# Patient Record
Sex: FEMALE | Race: WHITE | NOT HISPANIC OR LATINO | Employment: OTHER | URBAN - METROPOLITAN AREA
[De-identification: names, ages, dates, MRNs, and addresses within clinical notes are randomized per-mention and may not be internally consistent; named-entity substitution may affect disease eponyms.]

---

## 2020-06-30 PROBLEM — M50.20 HERNIATED DISC, CERVICAL: Status: ACTIVE | Noted: 2020-06-30

## 2020-06-30 PROBLEM — E78.2 MIXED HYPERLIPIDEMIA: Status: ACTIVE | Noted: 2020-06-30

## 2020-06-30 PROBLEM — E11.65 UNCONTROLLED TYPE 2 DIABETES MELLITUS WITH HYPERGLYCEMIA: Status: ACTIVE | Noted: 2020-06-30

## 2020-07-10 ENCOUNTER — TELEPHONE (OUTPATIENT)
Dept: NEPHROLOGY | Facility: CLINIC | Age: 70
End: 2020-07-10

## 2020-07-10 NOTE — TELEPHONE ENCOUNTER
----- Message from Claudette Mccoy sent at 7/10/2020 10:14 AM CDT -----  Regarding: Sooner appointment request  Contact: pt  Type: Sooner appointment request    Caller is requesting a sooner appointment.    Who Called:  pt  When is the first available appointment: 8/19/20  Symptoms:   creatinine levels are high  Best call back number: [phone]  Additional Information:  New pt need appt . Please advise

## 2020-07-10 NOTE — TELEPHONE ENCOUNTER
Left message on voicemail for patient to call me back if I can help.      It appears this patient is scheduled for Monday.

## 2020-07-13 ENCOUNTER — TELEPHONE (OUTPATIENT)
Dept: NEPHROLOGY | Facility: CLINIC | Age: 70
End: 2020-07-13

## 2020-07-13 ENCOUNTER — OFFICE VISIT (OUTPATIENT)
Dept: NEPHROLOGY | Facility: CLINIC | Age: 70
End: 2020-07-13
Payer: MEDICARE

## 2020-07-13 VITALS — SYSTOLIC BLOOD PRESSURE: 130 MMHG | DIASTOLIC BLOOD PRESSURE: 80 MMHG

## 2020-07-13 DIAGNOSIS — N17.9 AKI (ACUTE KIDNEY INJURY): Primary | ICD-10-CM

## 2020-07-13 PROCEDURE — 1101F PR PT FALLS ASSESS DOC 0-1 FALLS W/OUT INJ PAST YR: ICD-10-PCS | Mod: CPTII,95,, | Performed by: INTERNAL MEDICINE

## 2020-07-13 PROCEDURE — 99204 OFFICE O/P NEW MOD 45 MIN: CPT | Mod: 95,,, | Performed by: INTERNAL MEDICINE

## 2020-07-13 PROCEDURE — 99204 PR OFFICE/OUTPT VISIT, NEW, LEVL IV, 45-59 MIN: ICD-10-PCS | Mod: 95,,, | Performed by: INTERNAL MEDICINE

## 2020-07-13 PROCEDURE — 1159F PR MEDICATION LIST DOCUMENTED IN MEDICAL RECORD: ICD-10-PCS | Mod: 95,,, | Performed by: INTERNAL MEDICINE

## 2020-07-13 PROCEDURE — 1101F PT FALLS ASSESS-DOCD LE1/YR: CPT | Mod: CPTII,95,, | Performed by: INTERNAL MEDICINE

## 2020-07-13 PROCEDURE — 1159F MED LIST DOCD IN RCRD: CPT | Mod: 95,,, | Performed by: INTERNAL MEDICINE

## 2020-07-13 NOTE — TELEPHONE ENCOUNTER
----- Message from Lakeshia Clark sent at 7/13/2020  3:28 PM CDT -----  Contact: Collin with [facility]  Type: Needs Medical Advice  Who Called:  Collin with [facility]  Best Call Back Number: [phone]  Additional Information: calling to get the surgery clearance from patient's appointment today. Please fax the information over to .

## 2020-07-13 NOTE — PROGRESS NOTES
Subjective:       Patient ID: Agustina Crow is a 69 y.o. White female who presents for return patient evaluation for chronic renal failure.    The patient location is:  Patient Home   The chief complaint leading to consultation is: elevated SCr  Visit type: Virtual visit with synchronous audio and video  Total time spent with patient: 20 minutes  Each patient to whom he or she provides medical services by telemedicine is:  (1) informed of the relationship between the physician and patient and the respective role of any other health care provider with respect to management of the patient; and (2) notified that he or she may decline to receive medical services by telemedicine and may withdraw from such care at any time.     She presents today to be evaluated prior to her planned surgery tomorrow for C5-6 disk disease.  She has no uremic or urinary symptoms and is in her usual state of health.  There have been no recent illnesses, hospitalizations or procedures.  She denies NSAIDs.  Her recent creatinine was 1.3.        Review of Systems   Constitutional: Negative for appetite change, chills and fever.   HENT: Negative for congestion.    Eyes: Negative for visual disturbance.   Respiratory: Negative for cough and shortness of breath.    Cardiovascular: Negative for chest pain and leg swelling.   Gastrointestinal: Negative for abdominal pain, diarrhea, nausea and vomiting.   Genitourinary: Negative for difficulty urinating, dysuria and hematuria.   Musculoskeletal: Positive for neck pain. Negative for myalgias.   Skin: Negative for rash.   Neurological: Negative for headaches.   Psychiatric/Behavioral: Negative for sleep disturbance.       The past medical, family and social histories were reviewed for this encounter.     /80     Objective:      Physical Exam  Vitals signs reviewed.   Constitutional:       General: She is not in acute distress.     Appearance: She is well-developed.   HENT:      Head:  Normocephalic and atraumatic.   Eyes:      General: No scleral icterus.  Pulmonary:      Effort: Pulmonary effort is normal.   Neurological:      Mental Status: She is alert and oriented to person, place, and time.   Psychiatric:         Mood and Affect: Mood normal.         Behavior: Behavior normal.        Assessment:       1. TAMERA (acute kidney injury)        Plan:   Return to clinic in 4 mo.  Labs for next visit include rp, ua next week.  Baseline creatinine is 0.8.  Continue current medications as prescribed and reviewed.   Blood pressure is controlled on the current regimen.  I will send this message to both Catalina Herron MD and Dr. Cooper.  I see no reason to not proceed with the planned procedure tomorrow.  I will re-evaluate her after that is done.

## 2020-07-23 ENCOUNTER — TELEPHONE (OUTPATIENT)
Dept: NEPHROLOGY | Facility: CLINIC | Age: 70
End: 2020-07-23

## 2020-07-23 DIAGNOSIS — N17.9 AKI (ACUTE KIDNEY INJURY): Primary | ICD-10-CM

## 2020-07-23 NOTE — TELEPHONE ENCOUNTER
----- Message from Chema DUARTE Avtar sent at 7/23/2020 12:16 PM CDT -----  Regarding: Orders  Contact: [facility] Cat Fernandez called in and stated patient showed up to get labs done & told her that Dr. Stiles told her to go there & have it done but they have no orders?    Rebecca's call back number is [phone]

## 2020-07-23 NOTE — TELEPHONE ENCOUNTER
Labs entered and available for:    Return to clinic in 4 mo.  Labs for next visit include rp, ua next week.    No answer at number provided.

## 2021-01-22 ENCOUNTER — PATIENT MESSAGE (OUTPATIENT)
Dept: ADMINISTRATIVE | Facility: OTHER | Age: 71
End: 2021-01-22

## 2021-02-09 ENCOUNTER — CLINICAL SUPPORT (OUTPATIENT)
Dept: URGENT CARE | Facility: CLINIC | Age: 71
End: 2021-02-09
Payer: MEDICARE

## 2021-02-09 DIAGNOSIS — U07.1 COVID-19 VIRUS DETECTED: ICD-10-CM

## 2021-02-09 DIAGNOSIS — Z11.52 ENCOUNTER FOR SCREENING LABORATORY TESTING FOR COVID-19 VIRUS IN ASYMPTOMATIC PATIENT: Primary | ICD-10-CM

## 2021-02-09 DIAGNOSIS — Z01.812 ENCOUNTER FOR SCREENING LABORATORY TESTING FOR COVID-19 VIRUS IN ASYMPTOMATIC PATIENT: Primary | ICD-10-CM

## 2021-02-09 LAB
CTP QC/QA: YES
SARS-COV-2 RDRP RESP QL NAA+PROBE: POSITIVE

## 2021-02-09 PROCEDURE — U0002: ICD-10-PCS | Mod: QW,S$GLB,, | Performed by: NURSE PRACTITIONER

## 2021-02-09 PROCEDURE — U0002 COVID-19 LAB TEST NON-CDC: HCPCS | Mod: QW,S$GLB,, | Performed by: NURSE PRACTITIONER

## 2022-01-21 ENCOUNTER — TELEPHONE (OUTPATIENT)
Dept: NEUROLOGY | Facility: CLINIC | Age: 72
End: 2022-01-21
Payer: MEDICARE

## 2022-01-21 NOTE — TELEPHONE ENCOUNTER
Left message for the the daughter (Michael [phone]) to call back to discuss scheduling the pt with NP for memory decline. Pt referred from [facility].

## 2022-01-26 PROBLEM — N18.31 CHRONIC KIDNEY DISEASE, STAGE 3A: Status: ACTIVE | Noted: 2022-01-26

## 2022-01-31 ENCOUNTER — OFFICE VISIT (OUTPATIENT)
Dept: URGENT CARE | Facility: CLINIC | Age: 72
End: 2022-01-31
Payer: MEDICARE

## 2022-01-31 VITALS
HEIGHT: 65 IN | BODY MASS INDEX: 21.66 KG/M2 | DIASTOLIC BLOOD PRESSURE: 71 MMHG | WEIGHT: 130 LBS | OXYGEN SATURATION: 99 % | SYSTOLIC BLOOD PRESSURE: 159 MMHG | RESPIRATION RATE: 16 BRPM | HEART RATE: 69 BPM | TEMPERATURE: 98 F

## 2022-01-31 DIAGNOSIS — R05.9 COUGH: Primary | ICD-10-CM

## 2022-01-31 DIAGNOSIS — B34.9 VIRAL SYNDROME: ICD-10-CM

## 2022-01-31 LAB
CTP QC/QA: YES
SARS-COV-2 RDRP RESP QL NAA+PROBE: NEGATIVE

## 2022-01-31 PROCEDURE — 4010F PR ACE/ARB THEARPY RXD/TAKEN: ICD-10-PCS | Mod: CPTII,S$GLB,, | Performed by: EMERGENCY MEDICINE

## 2022-01-31 PROCEDURE — 3077F PR MOST RECENT SYSTOLIC BLOOD PRESSURE >= 140 MM HG: ICD-10-PCS | Mod: CPTII,S$GLB,, | Performed by: EMERGENCY MEDICINE

## 2022-01-31 PROCEDURE — U0002 COVID-19 LAB TEST NON-CDC: HCPCS | Mod: QW,S$GLB,, | Performed by: EMERGENCY MEDICINE

## 2022-01-31 PROCEDURE — 3078F DIAST BP <80 MM HG: CPT | Mod: CPTII,S$GLB,, | Performed by: EMERGENCY MEDICINE

## 2022-01-31 PROCEDURE — 4010F ACE/ARB THERAPY RXD/TAKEN: CPT | Mod: CPTII,S$GLB,, | Performed by: EMERGENCY MEDICINE

## 2022-01-31 PROCEDURE — U0002: ICD-10-PCS | Mod: QW,S$GLB,, | Performed by: EMERGENCY MEDICINE

## 2022-01-31 PROCEDURE — 99203 OFFICE O/P NEW LOW 30 MIN: CPT | Mod: S$GLB,CS,, | Performed by: EMERGENCY MEDICINE

## 2022-01-31 PROCEDURE — 1159F PR MEDICATION LIST DOCUMENTED IN MEDICAL RECORD: ICD-10-PCS | Mod: CPTII,S$GLB,, | Performed by: EMERGENCY MEDICINE

## 2022-01-31 PROCEDURE — 3008F BODY MASS INDEX DOCD: CPT | Mod: CPTII,S$GLB,, | Performed by: EMERGENCY MEDICINE

## 2022-01-31 PROCEDURE — 99203 PR OFFICE/OUTPT VISIT, NEW, LEVL III, 30-44 MIN: ICD-10-PCS | Mod: S$GLB,CS,, | Performed by: EMERGENCY MEDICINE

## 2022-01-31 PROCEDURE — 3008F PR BODY MASS INDEX (BMI) DOCUMENTED: ICD-10-PCS | Mod: CPTII,S$GLB,, | Performed by: EMERGENCY MEDICINE

## 2022-01-31 PROCEDURE — 3078F PR MOST RECENT DIASTOLIC BLOOD PRESSURE < 80 MM HG: ICD-10-PCS | Mod: CPTII,S$GLB,, | Performed by: EMERGENCY MEDICINE

## 2022-01-31 PROCEDURE — 1160F PR REVIEW ALL MEDS BY PRESCRIBER/CLIN PHARMACIST DOCUMENTED: ICD-10-PCS | Mod: CPTII,S$GLB,, | Performed by: EMERGENCY MEDICINE

## 2022-01-31 PROCEDURE — 1159F MED LIST DOCD IN RCRD: CPT | Mod: CPTII,S$GLB,, | Performed by: EMERGENCY MEDICINE

## 2022-01-31 PROCEDURE — 1160F RVW MEDS BY RX/DR IN RCRD: CPT | Mod: CPTII,S$GLB,, | Performed by: EMERGENCY MEDICINE

## 2022-01-31 PROCEDURE — 3077F SYST BP >= 140 MM HG: CPT | Mod: CPTII,S$GLB,, | Performed by: EMERGENCY MEDICINE

## 2022-01-31 NOTE — PROGRESS NOTES
"Subjective:       Patient ID: Agustina Crow is a 71 y.o. female.    Vitals:  height is 5' 4.75" (1.645 m) and weight is 59 kg (130 lb). Her temperature is 98.1 °F (36.7 °C). Her blood pressure is 159/71 (abnormal) and her pulse is 69. Her respiration is 16 and oxygen saturation is 99%.     Chief Complaint: COVID-19 Concerns    Sore throat, cough- started last night   No OTC mediations   Exposed to COVID, COVID vaccinated     Other  This is a new problem. The current episode started yesterday. The problem occurs constantly. The problem has been gradually worsening. Associated symptoms include congestion, coughing and a sore throat. She has tried nothing for the symptoms. The treatment provided no relief.       HENT: Positive for congestion and sore throat.    Respiratory: Positive for cough.        Objective:      Physical Exam   Constitutional: She is oriented to person, place, and time. She appears well-developed and well-nourished. She is cooperative.  Non-toxic appearance. She does not have a sickly appearance. She does not appear ill. No distress.   HENT:   Head: Normocephalic and atraumatic.   Ears:   Right Ear: Hearing and external ear normal.   Left Ear: Hearing and external ear normal.   Nose: Nose normal. No mucosal edema, rhinorrhea or nasal deformity. No epistaxis. Right sinus exhibits no maxillary sinus tenderness and no frontal sinus tenderness. Left sinus exhibits no maxillary sinus tenderness and no frontal sinus tenderness.   Mouth/Throat: Uvula is midline, oropharynx is clear and moist and mucous membranes are normal. Mucous membranes are moist. No trismus in the jaw. Normal dentition. No uvula swelling. No oropharyngeal exudate, posterior oropharyngeal edema or posterior oropharyngeal erythema. Oropharynx is clear.   Eyes: Conjunctivae and lids are normal. No scleral icterus.   Neck: Trachea normal and phonation normal. Neck supple. No edema present. No erythema present. No neck rigidity present. "   Cardiovascular: Normal rate, regular rhythm, normal heart sounds, intact distal pulses and normal pulses.   Pulmonary/Chest: Effort normal and breath sounds normal. No respiratory distress. She has no decreased breath sounds. She has no wheezes. She has no rhonchi. She has no rales.   Abdominal: Normal appearance.   Musculoskeletal: Normal range of motion.         General: No deformity or edema. Normal range of motion.   Neurological: She is alert and oriented to person, place, and time. She exhibits normal muscle tone. Coordination normal.   Skin: Skin is warm, dry, intact, not diaphoretic and not pale.   Psychiatric: She has a normal mood and affect. Her speech is normal and behavior is normal. Judgment and thought content normal. Cognition and memory  Nursing note and vitals reviewed.    Results for orders placed or performed in visit on 01/31/22   POCT COVID-19 Rapid Screening   Result Value Ref Range    POC Rapid COVID Negative Negative     Acceptable Yes            Assessment:       1. Cough    2. Viral syndrome          Plan:         Cough  -     POCT COVID-19 Rapid Screening    Viral syndrome

## 2022-03-24 ENCOUNTER — OFFICE VISIT (OUTPATIENT)
Dept: NEUROLOGY | Facility: CLINIC | Age: 72
End: 2022-03-24
Payer: MEDICARE

## 2022-03-24 VITALS
SYSTOLIC BLOOD PRESSURE: 147 MMHG | HEIGHT: 63 IN | WEIGHT: 131.5 LBS | RESPIRATION RATE: 18 BRPM | DIASTOLIC BLOOD PRESSURE: 67 MMHG | HEART RATE: 74 BPM | BODY MASS INDEX: 23.3 KG/M2

## 2022-03-24 DIAGNOSIS — R41.3 MEMORY LOSS: Primary | ICD-10-CM

## 2022-03-24 PROCEDURE — 1159F PR MEDICATION LIST DOCUMENTED IN MEDICAL RECORD: ICD-10-PCS | Mod: CPTII,S$GLB,, | Performed by: NURSE PRACTITIONER

## 2022-03-24 PROCEDURE — 3008F BODY MASS INDEX DOCD: CPT | Mod: CPTII,S$GLB,, | Performed by: NURSE PRACTITIONER

## 2022-03-24 PROCEDURE — 3051F HG A1C>EQUAL 7.0%<8.0%: CPT | Mod: CPTII,S$GLB,, | Performed by: NURSE PRACTITIONER

## 2022-03-24 PROCEDURE — 4010F ACE/ARB THERAPY RXD/TAKEN: CPT | Mod: CPTII,S$GLB,, | Performed by: NURSE PRACTITIONER

## 2022-03-24 PROCEDURE — 3077F SYST BP >= 140 MM HG: CPT | Mod: CPTII,S$GLB,, | Performed by: NURSE PRACTITIONER

## 2022-03-24 PROCEDURE — 1159F MED LIST DOCD IN RCRD: CPT | Mod: CPTII,S$GLB,, | Performed by: NURSE PRACTITIONER

## 2022-03-24 PROCEDURE — 3008F PR BODY MASS INDEX (BMI) DOCUMENTED: ICD-10-PCS | Mod: CPTII,S$GLB,, | Performed by: NURSE PRACTITIONER

## 2022-03-24 PROCEDURE — 3051F PR MOST RECENT HEMOGLOBIN A1C LEVEL 7.0 - < 8.0%: ICD-10-PCS | Mod: CPTII,S$GLB,, | Performed by: NURSE PRACTITIONER

## 2022-03-24 PROCEDURE — 1126F PR PAIN SEVERITY QUANTIFIED, NO PAIN PRESENT: ICD-10-PCS | Mod: CPTII,S$GLB,, | Performed by: NURSE PRACTITIONER

## 2022-03-24 PROCEDURE — 4010F PR ACE/ARB THEARPY RXD/TAKEN: ICD-10-PCS | Mod: CPTII,S$GLB,, | Performed by: NURSE PRACTITIONER

## 2022-03-24 PROCEDURE — 3078F PR MOST RECENT DIASTOLIC BLOOD PRESSURE < 80 MM HG: ICD-10-PCS | Mod: CPTII,S$GLB,, | Performed by: NURSE PRACTITIONER

## 2022-03-24 PROCEDURE — 99204 PR OFFICE/OUTPT VISIT, NEW, LEVL IV, 45-59 MIN: ICD-10-PCS | Mod: S$GLB,,, | Performed by: NURSE PRACTITIONER

## 2022-03-24 PROCEDURE — 99999 PR PBB SHADOW E&M-EST. PATIENT-LVL IV: ICD-10-PCS | Mod: PBBFAC,,, | Performed by: NURSE PRACTITIONER

## 2022-03-24 PROCEDURE — 1160F RVW MEDS BY RX/DR IN RCRD: CPT | Mod: CPTII,S$GLB,, | Performed by: NURSE PRACTITIONER

## 2022-03-24 PROCEDURE — 3077F PR MOST RECENT SYSTOLIC BLOOD PRESSURE >= 140 MM HG: ICD-10-PCS | Mod: CPTII,S$GLB,, | Performed by: NURSE PRACTITIONER

## 2022-03-24 PROCEDURE — 1126F AMNT PAIN NOTED NONE PRSNT: CPT | Mod: CPTII,S$GLB,, | Performed by: NURSE PRACTITIONER

## 2022-03-24 PROCEDURE — 99999 PR PBB SHADOW E&M-EST. PATIENT-LVL IV: CPT | Mod: PBBFAC,,, | Performed by: NURSE PRACTITIONER

## 2022-03-24 PROCEDURE — 1160F PR REVIEW ALL MEDS BY PRESCRIBER/CLIN PHARMACIST DOCUMENTED: ICD-10-PCS | Mod: CPTII,S$GLB,, | Performed by: NURSE PRACTITIONER

## 2022-03-24 PROCEDURE — 99204 OFFICE O/P NEW MOD 45 MIN: CPT | Mod: S$GLB,,, | Performed by: NURSE PRACTITIONER

## 2022-03-24 PROCEDURE — 3078F DIAST BP <80 MM HG: CPT | Mod: CPTII,S$GLB,, | Performed by: NURSE PRACTITIONER

## 2022-03-24 NOTE — PROGRESS NOTES
NEUROLOGY  Outpatient CONSULT    [facility]  [address] [facility], [place] [address]  [phone] (office) / [phone] (fax)    Patient Name:  Agustina Crow  :  1950  MR #:  [id number]  Acct #:  [id number]    Date of Neurology Consult: 2022  Name of Provider: THOMAS Thacker    Other Physicians:  Mavis Jordan MD (Primary Care Physician); Self, Aaareferral (Referring)      Chief Complaint: Memory Loss      History of Present Illness (HPI):  Agustina Crow is a right handed 71 y.o. female.    Patient is here today for memory loss.   She is accompanied by her daughters who help supply information. Patient states she has short term difficulty. She will often forget what tasks she is doing. She has good days and bad days. Daughters also report poor recall. She is often repetitious.       Patient's highest level of education completed was 1 year of college. 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 The onset of memory issues began about 2 years ago (2019). She struggles more with short term memory. Daughters state patient has been very short tempered and agitated recently. She was started on Zoloft in January which has helped a little. She also reports depression but taking Zoloft has helped. She gets easily frustrated. She denies suicidal ideation. She denies hallucinations. She states she sleeps well at night and denies daytime sleeping. She does well with executive function. Patient is managing the finances. Patient was caught in a phishing scam. Her bills are automatically withdrawn. She is managing her own medications and denies issues. She denies issues with hygiene and is able to perform ADLs without assistance. She may often want to say a word and cannot speak the correct word. She is able to comprehend well. She denies urinary incontinence. She denies recent falls. She is still driving and denies recent MVAs or getting  "lost in familiar areas. She likes to sew, play with her grand children and gardening.            Past Medical, Surgical, Family & Social History:   History reviewed. No pertinent past medical history.  Past Surgical History:   Procedure Laterality Date    SPINAL FUSION       History reviewed. No pertinent family history.  Alcohol use:  reports no history of alcohol use.   (Of note, 0.6 oz = 1 beer or 6 oz = 10 beers).  Tobacco use:  reports that she has never smoked. She has never used smokeless tobacco.  Street drug use:  reports no history of drug use.  Allergies: Pcn [penicillins].    Home Medications:     Current Outpatient Medications:     aspirin (ECOTRIN) 81 MG EC tablet, Take 1 tablet (81 mg total) by mouth once daily., Disp: , Rfl: 0    atorvastatin (LIPITOR) 20 MG tablet, TAKE 1 TABLET BY MOUTH EVERY DAY, Disp: 90 tablet, Rfl: 1    blood sugar diagnostic Strp, 1 strip by Misc.(Non-Drug; Combo Route) route once daily., Disp: 90 strip, Rfl: 3    cyclobenzaprine (FLEXERIL) 10 MG tablet, TAKE 1 TABLET BY MOUTH THREE TIMES A DAY AS NEEDED FOR SPASMS, Disp: 15 tablet, Rfl: 0    fenofibrate 160 MG Tab, TAKE 1 TABLET BY MOUTH EVERY DAY, Disp: 90 tablet, Rfl: 2    lancets Misc, 1 lancet by Misc.(Non-Drug; Combo Route) route once daily., Disp: 90 each, Rfl: 3    lisinopriL (PRINIVIL,ZESTRIL) 2.5 MG tablet, TAKE 1 TABLET BY MOUTH EVERY DAY, Disp: 90 tablet, Rfl: 1    metFORMIN (GLUCOPHAGE) 500 MG tablet, TAKE 1 TABLET BY MOUTH TWICE A DAY WITH MEALS, Disp: 180 tablet, Rfl: 2    sertraline (ZOLOFT) 25 MG tablet, Take 1 tablet (25 mg total) by mouth once daily., Disp: 30 tablet, Rfl: 2    blood-glucose meter Misc, 1 Device by Misc.(Non-Drug; Combo Route) route once daily. for 1 day, Disp: 1 each, Rfl: 0    Physical Examination:  BP (!) 147/67 (BP Location: Left arm, Patient Position: Sitting, BP Method: Medium (Automatic))   Pulse 74   Resp 18   Ht 5' 3" (1.6 m)   Wt 59.6 kg (131 lb 8.1 oz)   BMI 23.29 " "kg/m²   MMSE 3/24/2022   What is the (year), (season), (date), (day), (month)? 3   Where are we (state), (country), (town or city), (hospital), (floor)? 3   Name 3 common objects (eg. "apple", "table", "sandra"). Take 1 second to say each. Then ask the patient to repeat all 3. Give 1 point for each correct answer. Then repeat them until he/she learns all 3. Count trials and record. 3   Serial 7's backwards. Stop after 5 answers. (100,93,86,79,72) or alternatively  spell "WORLD" backwards. (D..L..R..O..W). The score is the number of letters in correct order. 2   Ask for the 3 common objects named earlier in the exam. Give 1 point for each correct answer. 0   Name a "pencil" and "watch." 2   Repeat the following: "No ifs, ands, or buts." 1   Follow a 3-stage command: "Take a paper in your right hand, fold it in half, & put it on the floor." 3   Read and obey the following: (see paper exam) 1   Write a sentence. 1   Copy the following design: (see paper exam) 1   Total MMSE Score 20   Some recent data might be hidden       GENERAL:  General appearance: Well, non-toxic appearing.  No apparent distress.  Neck: supple.  .    MENTAL STATUS:  Alertness, attention span & concentration: normal.  Language: normal.  Orientation to self, place & time:  normal.  Memory, recent & remote: normal.  Fund of knowledge: normal.  MMSE:20/30    SPEECH:  Clear and fluent.  Follows complex commands.    CRANIAL NERVES:  Cranial Nerves II-XII were examined.  II - Visual fields: normal.  III, IV, VI: PERRL, EOMI, No ptosis, No nystagmus.  V - Facial sensation: normal.  VII - Face symmetry & mobility: Due to Covid-19 recommended precautions, patient wearing facial mask; mouth and nose not examined.  VIII - Hearing: normal.  IX, X - Palate: Due to Covid-19 recommended precautions, patient wearing facial mask; mouth and nose not examined.  XI - Shoulder shrug: normal.  XII - Tongue protrusion: Due to Covid-19 recommended precautions, patient " wearing facial mask; mouth and nose not examined.    GROSS MOTOR:  Gait & station: non focal  Tone: normal.  Abnormal movements: none.  Finger-nose: normal.  Rapid alternating movements: normal.  Pronator drift: normal      MUSCLE STRENGTH:     Fascics Atrophy RIGHT    LEFT Atrophy Fascics     5 Biceps 5       5 Triceps 5       5 Forearm.Pr. 5                4+ Iliopsoas flex    4+       5 Hip Abduct 5       5 Hip Adduct 5       5 Quads 5       5 Hams 5       5 Dorsiflex 5       5 Plantar Flex 5                REFLEXES:    RIGHT Reflex   LEFT   2+ Biceps 2+   2+ Brachiorad. 2+        3+ Patellar 3+     SENSORY:  Light touch: Normal throughout.           Diagnostic Data Reviewed:  Component      Latest Ref Rng & Units 2/8/2022   WBC      3.90 - 12.70 K/uL 5.77   RBC      4.00 - 5.40 M/uL 4.46   Hemoglobin      12.0 - 16.0 g/dL 13.1   Hematocrit      37.0 - 48.5 % 40.4   MCV      82 - 98 fL 91   MCH      27.0 - 31.0 pg 29.4   MCHC      32.0 - 36.0 g/dL 32.4   RDW      11.5 - 14.5 % 12.5   Platelets      150 - 450 K/uL 295   MPV      9.2 - 12.9 fL 9.6   Immature Granulocytes      0.0 - 0.5 % 0.3   Gran # (ANC)      1.8 - 7.7 K/uL 3.0   Immature Grans (Abs)      0.00 - 0.04 K/uL 0.02   Lymph #      1.0 - 4.8 K/uL 2.3   Mono #      0.3 - 1.0 K/uL 0.3   Eos #      0.0 - 0.5 K/uL 0.1   Baso #      0.00 - 0.20 K/uL 0.05   nRBC      0 /100 WBC 0   Gran %      38.0 - 73.0 % 52.3   Lymph %      18.0 - 48.0 % 39.3   Mono %      4.0 - 15.0 % 4.9   Eosinophil %      0.0 - 8.0 % 2.3   Basophil %      0.0 - 1.9 % 0.9   Differential Method       Automated   Sodium      136 - 145 mmol/L 140   Potassium      3.5 - 5.1 mmol/L 4.8   Chloride      95 - 110 mmol/L 105   CO2      22 - 31 mmol/L 27   Glucose      70 - 110 mg/dL 183 (H)   BUN      7 - 18 mg/dL 22 (H)   Creatinine      0.50 - 1.40 mg/dL 1.05   Calcium      8.4 - 10.2 mg/dL 10.1   PROTEIN TOTAL      6.0 - 8.4 g/dL 6.8   Albumin      3.5 - 5.2 g/dL 4.4   BILIRUBIN TOTAL       0.2 - 1.3 mg/dL 0.4   Alkaline Phosphatase      38 - 145 U/L 44   AST      14 - 36 U/L 26   ALT      0 - 35 U/L 16   Anion Gap      8 - 16 mmol/L 8   eGFR if African American      >60 mL/min/1.73 m:2 >60   eGFR if non African American      >60 mL/min/1.73 m:2 53 (A)   Cholesterol      120 - 199 mg/dL 180   Triglycerides      30 - 150 mg/dL 95   HDL      40 - 75 mg/dL 53   LDL Cholesterol External      63.0 - 159.0 mg/dL 108.0   HDL/Cholesterol Ratio      20.0 - 50.0 % 29.4   Total Cholesterol/HDL Ratio      2.0 - 5.0 3.4   Non-HDL Cholesterol      mg/dL 127   Hemoglobin A1C External      0.0 - 5.6 % 7.9 (H)   Estimated Avg Glucose      68 - 131 mg/dL 180 (H)   Vitamin B-12      210 - 950 pg/mL 298   RPR      Non-reactive Non-reactive   TSH      0.400 - 4.000 uIU/mL 2.630                 Assessment and Plan:  Agustina Crow is a 71 y.o. female.    Problem List Items Addressed This Visit        Neuro    Memory loss - Primary    Current Assessment & Plan     Patient is a 72 y/o female with complaints of short term memory loss and trouble with recall as per daughters report.  There are no reports of hallucinations, sleep disturbances, recent falls or urinary incontinence. Pt does report depression and family endorses increased agitation and frustration in the recent months. Zoloft has helped with this.   MMSE today 20/30   - pt was very anxious during testing and answered very quickly.    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    - hold for now as family would like to complete testing first.    - consider trial of Aricept  Agree with Zoloft as it has helped symptomatically. Advised pt and family to communicate the need for possible higher dose with PCP as she is managing.   Discussed ways to promote brain stimulation.    There are no safety concerns                                    Important to note, also  has no past medical history on file.            The patient will return to clinic in 4-6 months with either Dr. Costello or Dr. Adam after testing completed        All questions were answered and patient is comfortable with the plan.       Thank you very much for the opportunity to assist in this patient's care.    If you have any questions or concerns, please do not hesitate to contact me at any time.    Sincerely,     THOMAS Thacker  [facility] [place]         SELENA spent a total of 57 minutes on the day of the visit.This includes face to face time and non-face to face time preparing to see the patient (eg, review of tests), Obtaining and/or reviewing separately obtained history, Documenting clinical information in the electronic or other health record, Independently interpreting resultsand communicating results to the patient/family/caregiver, or Care coordination.

## 2022-03-24 NOTE — ASSESSMENT & PLAN NOTE
Patient is a 72 y/o female with complaints of short term memory loss and trouble with recall as per daughters report.  There are no reports of hallucinations, sleep disturbances, recent falls or urinary incontinence. Pt does report depression and family endorses increased agitation and frustration in the recent months. Zoloft has helped with this.   MMSE today 20/30   - pt was very anxious during testing and answered very quickly.    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    - hold for now as family would like to complete testing first.    - consider trial of Aricept  Agree with Zoloft as it has helped symptomatically. Advised pt and family to communicate the need for possible higher dose with PCP as she is managing.   Discussed ways to promote brain stimulation.   There are no safety concerns

## 2022-03-24 NOTE — PATIENT INSTRUCTIONS
To prevent further memory loss, some of the best preventative measures are following a healthy diet, getting regular exercise, and ensuring good sleep habits.  Approximately 30 to 45 minutes of brisk physical activity (brisk walking, swimming, stationary bicycle, etc.) 5 days a week has been shown to improve function in vascular dementias, and can lower the risk of stroke and slow progression of memory loss.        To prevent further memory loss, some of the best preventative measures are following a healthy diet, getting regular exercise, and ensuring good sleep habits.  Approximately 30 to 45 minutes of brisk physical activity (brisk walking, swimming, stationary bicycle, etc.) 5 days a week has been shown to improve function in vascular dementias, and can lower the risk of stroke and slow progression of memory loss.    A Mediterranean style diet, or the DASH diet with lots of fresh fruits and vegetables, whole grains, more fish and chicken, less red meat, less dairy, less processed foods is also beneficial. For more information see:     https://www.rush.[facility]/news/diet-may-help-prevent-alzheimers     Minimize or eliminate the use of alcohol, and discuss any prescription medications you might be taking with your doctor to avoid medications which can cause sedation or worsen cognitive function.    Establish a regular, consistent sleep pattern and practice good sleep hygiene.  Avoid screen time (computer, TV, smartphones or tablets) or heavy meals for at least an hour before bedtime, and avoid caffeine or stimulants after 2 PM. Exercise earlier in the day or mornings and keep your sleeping environment comfortable.     Socializing with friends and family and staying both mentally and physically active is also very important. Continue with hobbies or activities that are engaging and practice activities that are mentally stimulating (word puzzles, Sudoku, etc) and stay active in the community.    Some supplements which  may be of potential benefit include:  - tumeric (curcumin) supplement  - omega-3 fatty acids with sufficient amounts of EPA and DHA, 1000 to 2000 mg a day   - Vitamin D3 supplement 2000 units (IU) daily   - Green tea and green tea extracts may also help (caffeine free)   - supplements containing Phosphatidylserine (PS) and phosphatidylcholine (PC)     There may be some benefit to dietary supplements, however there is no definitive evidence to support the prevention of cognitive impairment or dementia.     Please look into the following websites, to help you find resources including day programs, caregivers and caregiver support, legal questions, support groups, etc.    [facility] ([facility])  [facility] - [address]  [facility] - [address]    Group meetings facilitated by Juan Manuel Gutierrez MA, JILLIAN [phone]    ADDRESS  Mailing Address:  [address] [place] [id number] [address] Address:  [address] [place] [address]   Web Address:  [email or web address]     Services offered at this location:  Chore, Congregate Meals, Home Delivered Meals, Homemaker, Information and Assistance, Legal, Material Aid, Medical Alert, Medication Management, NFCSP Information and Assistance, NFCSP In-Home Respite, NFCSP Material Aid, NGCSP Personal Care , NFCSP Public Education, NFCSP Sitter Service, NFCSP Support Groups, Nutrition Counseling, Nutrition Education, Outreach, Recreation, Transportation, Utility Assistance, Wellness, Area Agency on Aging, [place] on Aging      Website for the Alzheimers Association:  http://www.alz.org/   Excellent resources for finding community resources  Action plan navigator and online tools  Call 1274.749.9298 for 24/7 helpline    [facility] Office of Aging and Adult Services:  Http://www.ldh.[place].gov/index.cfm/subhome/12/n/7    [facility] Dementia: http://carepartnermentoring.com/    Website for [facility] Agency on Ageing:  http://goea.[place].[facility]/index.cfm?md=david&tmp=category&catID=38&nid=24&ssid=0&startIndex=1       PATIENT/FAMILY RESOURCES:  1. Alzheimer's Association       http://www.alz.org  2. Alzheimer's Foundation of [place]     http://www.alzfdn.org  3. The Alzheimers Disease Education and Referral Center  https://www.karina.nih.gov/alzheimers  4. Lewy Body Dementia Association      http://www.lbda.org  5. National [place] of Mental Health     http://www.nimh.nih.gov  6. National [place] on Mental Illness     http://www.eileen.org  7. Mental Health [place]       http://www.mentalhealthamerica.net  8. Mental Health.gov        http://www.mentalhealth.gov  9. National [place] for Behavioral Health     http://www.thenationalcouncil.org  10. Substance Abuse and Mental Health Services Administration  http://www.samhsa.gov    11. Licensed local counselors, social workers, psychiatrists, psychologists - one starting point is the Psychology Today website, therapist finder

## 2022-03-25 ENCOUNTER — TELEPHONE (OUTPATIENT)
Dept: NEUROLOGY | Facility: CLINIC | Age: 72
End: 2022-03-25
Payer: MEDICARE

## 2022-03-25 DIAGNOSIS — R41.3 MEMORY LOSS: Primary | ICD-10-CM

## 2022-03-25 RX ORDER — DIAZEPAM 5 MG/1
5 TABLET ORAL ONCE
Qty: 1 TABLET | Refills: 0 | Status: SHIPPED | OUTPATIENT
Start: 2022-03-25 | End: 2022-06-16

## 2022-03-25 NOTE — TELEPHONE ENCOUNTER
Patient requesting medication to reduce her anxiety while having MRI.  Patient's MRI is on 04/06 at 4:00 pm Correct pharmacy on file for patient.

## 2022-03-25 NOTE — TELEPHONE ENCOUNTER
Call placed to patient that Melissa has called in valium to patient's pharmacy on file to take prior to MRI.  Patient V/u.

## 2022-04-05 ENCOUNTER — TELEPHONE (OUTPATIENT)
Dept: ORTHOPEDICS | Facility: CLINIC | Age: 72
End: 2022-04-05
Payer: MEDICARE

## 2022-04-06 ENCOUNTER — TELEPHONE (OUTPATIENT)
Dept: NEUROLOGY | Facility: CLINIC | Age: 72
End: 2022-04-06
Payer: MEDICARE

## 2022-04-06 NOTE — TELEPHONE ENCOUNTER
Patient seen by ROSIBEL Thacker on 03/24/22 for a memory consultation.  Neuropsych referral in the system.  Please reach out to patient's family to scheduling testing.    Thanks,     Alethea CHAVEZ MA

## 2022-06-10 ENCOUNTER — PATIENT MESSAGE (OUTPATIENT)
Dept: NEUROLOGY | Facility: CLINIC | Age: 72
End: 2022-06-10
Payer: MEDICARE

## 2022-06-13 ENCOUNTER — TELEPHONE (OUTPATIENT)
Dept: NEUROLOGY | Facility: CLINIC | Age: 72
End: 2022-06-13
Payer: MEDICARE

## 2022-06-13 NOTE — TELEPHONE ENCOUNTER
Spoke with patient, and she said that her daughter is at work and would call me back to schedule her appointment. 6/13/2022 EV

## 2022-07-13 DIAGNOSIS — C50.912 INVASIVE DUCTAL CARCINOMA OF BREAST, FEMALE, LEFT: Primary | ICD-10-CM

## 2022-07-13 NOTE — PROGRESS NOTES
[facility] - Breast Surgery   History and Physical    Subjective:      Agustina Crow is a 71 y.o. female     Patient has felt a mass in her left upper breast for approximately a year.      Is undergoing early dementia workup due to short term memory loss.  Dtr resp therapist [facility]    Here with supportive son and daughter    Menarche at age 12. . Age at first live birth 17. Did breast feed for about a month. Menopause in late 40s early 50s . Denies HRT.  No personal history of ovarian/breast. Denies previous breast biopsy. No previous Genetic testing. Daughter and son here with her today.    Family history cancer:  Maternal Aunt- Breast Ca, age at dx unknown    Non Smoker  Covid-19 vaccinated-Left arm             Patient Active Problem List   Diagnosis    Uncontrolled type 2 diabetes mellitus with hyperglycemia    Herniated disc, cervical    Mixed hyperlipidemia    Chronic kidney disease, stage 3a    Memory loss    Invasive ductal carcinoma of left breast in female     Current Outpatient Medications on File Prior to Visit   Medication Sig Dispense Refill    blood sugar diagnostic Strp 1 strip by Misc.(Non-Drug; Combo Route) route once daily. 90 strip 3    cyanocobalamin (VITAMIN B-12) 100 MCG tablet Take 100 mcg by mouth once daily.      fenofibrate 160 MG Tab Take 1 tablet (160 mg total) by mouth once daily. 90 tablet 2    lancets Misc 1 lancet by Misc.(Non-Drug; Combo Route) route once daily. 90 each 3    lisinopriL (PRINIVIL,ZESTRIL) 2.5 MG tablet Take 1 tablet (2.5 mg total) by mouth once daily. 90 tablet 1    metFORMIN (GLUCOPHAGE) 500 MG tablet Take 1 tablet (500 mg total) by mouth 2 (two) times daily with meals. 180 tablet 2    sertraline (ZOLOFT) 50 MG tablet Take 0.5 tablets (25 mg total) by mouth once daily. 90 tablet 3    aspirin (ECOTRIN) 81 MG EC tablet Take 1 tablet (81 mg total) by mouth once daily.  0    blood-glucose meter Misc 1 Device by Misc.(Non-Drug; Combo Route)  "route once daily. for 1 day 1 each 0     No current facility-administered medications on file prior to visit.     Review of patient's allergies indicates:   Allergen Reactions    Pcn [penicillins] Anaphylaxis     Past Medical History:   Diagnosis Date    Diabetes mellitus     Hypertension      Past Surgical History:   Procedure Laterality Date    SPINAL FUSION       Family History   Problem Relation Age of Onset    Cancer Maternal Aunt         breast     Social History     Socioeconomic History    Marital status:    Tobacco Use    Smoking status: Never Smoker    Smokeless tobacco: Never Used   Substance and Sexual Activity    Alcohol use: Never    Drug use: Never         Review of Systems    No CP, No SOB  Positive short-term memory loss      Objective:      BP (!) 161/64   Pulse 61   Temp 97.9 °F (36.6 °C)   Resp 16   Ht 5' 4" (1.626 m)   Wt 57.6 kg (126 lb 15.8 oz)   SpO2 99%   BMI 21.80 kg/m²     Constitutional: NAD AAOX4  HEENT: EOMI, nonicteric sclera  NECK: no mass, no thyromegaly, no lymphadenopathy  CV: regular rate  PULM: good respiratory effort  ABDOMEN: soft, Nontender, nondistended. No guarding. No rebound.  MUSCULOSKELETAL: Good ROM  SKIN: No rashes or ulcerations seen.   NEUROLOGIC: CN grossly intact. Motor, sensory grossly intact    Breast exam   BC ptosis  Right: No mass, discharge, dimpling, lymphadenopathy  Left:  No discharge, dimpling, lymphadenopathy  LIQ 1cm FN 1cm movable  UIQ large mass 3cm FN 4x3cm mildly tethered mass      Lab Visit on 06/14/2022   Component Date Value Ref Range Status    Hemoglobin A1C 06/14/2022 7.3 (A) 0.0 - 5.6 % Final    Comment: Reference Interval:  5.0 - 5.6 Normal   5.7 - 6.4 High Risk   > 6.5 Diabetic       Hgb A1c results are standardized based on the (NGSP) National   Glycohemoglobin Standardization Program.      Hemoglobin A1C levels are related to mean serum/plasma glucose   during the preceding 2-3 months.          Estimated Avg " Glucose 06/14/2022 163 (A) 68 - 131 mg/dL Final    Vitamin B-12 06/14/2022 862  210 - 950 pg/mL Final    Comment: Patients taking very high Biotin doses of >300 mcg/day may   cause a positive bias in this assay.      Microalbumin, Urine 06/14/2022 7.6  ug/mL Final    Creatinine, Urine 06/14/2022 99.3  15.0 - 325.0 mg/dL Final    Microalb/Creat Ratio 06/14/2022 7.7  0.0 - 30.0 ug/mg Final         Patient Active Problem List   Diagnosis    Uncontrolled type 2 diabetes mellitus with hyperglycemia    Herniated disc, cervical    Mixed hyperlipidemia    Chronic kidney disease, stage 3a    Memory loss    Invasive ductal carcinoma of left breast in female        High complexity of problems addressed - breast cancer, treatment options, expectations, effects of treatment, risks, benefits. Complex data reviewed, independent interpretation of tests, discussion of management with another health care provider.    Alternative efficacious methods of treatment of breast cancer were given.  Options including lumpectomy, mastectomy, reconstruction options with advantages/disadvantages of each, provisions assuring coverage of reconstructive surgery costs, how the patient may access reconstructive care including the potential to be transferred to a facility that provides reconstructive care or choosing reconstruction after completion of breast cancer surgery and treatment.  LDH, LCLTF breast cancer brochure given.          Imaging and Chronology:       6/22/22 bilat scr MMG  Left 1200 posterior 2.9cm mass  Left LIQ anterior 1.2cm mass    6/28/22 left diag MMG, left US limited  Left 1200 posterior 2.9cm mass  Left LIQ anterior 1.2cm mass  Left axilla LN nml      7/6/22 US biopsy of both left lesions UIQ 1100 and LIQ  left 1100 UIQ 4cm FN   Grade 1 IDC  (2,2,1)  ER 98 OK 9 Her 2 2+ FISH neg Ki 14    Left 0800 LIQ 2cm FN hemangioma    7/18/22 MRI   Left 1100 UIQ posterior 2.7cm mass. 1cm from medial skin 9mm from pec  Left 0800  LIQ 1.3cm hemangioma  Right neg  Neg LN        Assessment/Plan:     Cancer Staging  Invasive ductal carcinoma of left breast in female  Staging form: Breast, AJCC 8th Edition  - Clinical stage from 7/14/2022: Stage IB (cT2, cN0, cM0, G1, ER+, MD+, HER2-) - Signed by Monie Krueger MD on 7/14/2022      Left UIQ 11:00 4 cm from nipple 2.9 cm grade 1 IDC.  Strongly ER positive.  Ki 14.  Lower inner quadrant 2 cm hemangioma by core biopsy.      Discussed all options.  She does have a prominent mass in the left upper breast but she does have some short-term memory loss and has adamantly requested lumpectomy.  I think that is reasonable.  Will plan large en bloc resection.  Explained she will have considerable volume loss but I do feel we can get neg margins.    Plan left upper lumpectomy, will also remove the hemangioma if still palpable, left sentinel lymph node biopsy.  no saviscout.  08/02/2022 CSC.    Xrt. Referral rad onc.  Endocrine  further recs after final surgical path    Saw in multi disciplinary clinic with Dr. Zhang med Onc.        I have given her all options - lumpectomy XRT, unilateral or bilateral mastectomy +/- reconstruction. I have explained procedure, risks, benefits, postop expectations. All questions answered. Risks include but are not limited to infection, bleeding, injury to nerves, vessels, numbness, weakness, difficulty lifting arm, swelling of arm (lymphedema), inability to remove all lesion, residual breast tissue, recurrence of malignancy, need for further procedure, loss of skin flap, seroma (fluid collection), inability to find sentinel lymph node, need for axillary dissection, chronic pain, radioactive substance may be given, allergic reaction, staining of the skin, difficulty with future imaging.

## 2022-07-14 ENCOUNTER — OFFICE VISIT (OUTPATIENT)
Dept: SURGERY | Facility: CLINIC | Age: 72
End: 2022-07-14
Payer: MEDICARE

## 2022-07-14 ENCOUNTER — OFFICE VISIT (OUTPATIENT)
Dept: HEMATOLOGY/ONCOLOGY | Facility: CLINIC | Age: 72
End: 2022-07-14
Payer: MEDICARE

## 2022-07-14 ENCOUNTER — TELEPHONE (OUTPATIENT)
Dept: HEMATOLOGY/ONCOLOGY | Facility: CLINIC | Age: 72
End: 2022-07-14
Payer: MEDICARE

## 2022-07-14 VITALS
HEIGHT: 64 IN | TEMPERATURE: 98 F | SYSTOLIC BLOOD PRESSURE: 161 MMHG | OXYGEN SATURATION: 99 % | BODY MASS INDEX: 21.68 KG/M2 | WEIGHT: 127 LBS | RESPIRATION RATE: 16 BRPM | DIASTOLIC BLOOD PRESSURE: 64 MMHG | HEART RATE: 61 BPM

## 2022-07-14 DIAGNOSIS — C50.912 INVASIVE DUCTAL CARCINOMA OF BREAST, FEMALE, LEFT: Primary | ICD-10-CM

## 2022-07-14 DIAGNOSIS — E78.2 MIXED HYPERLIPIDEMIA: ICD-10-CM

## 2022-07-14 DIAGNOSIS — E11.65 UNCONTROLLED TYPE 2 DIABETES MELLITUS WITH HYPERGLYCEMIA: ICD-10-CM

## 2022-07-14 DIAGNOSIS — N18.31 CHRONIC KIDNEY DISEASE, STAGE 3A: ICD-10-CM

## 2022-07-14 DIAGNOSIS — C50.912 INVASIVE DUCTAL CARCINOMA OF LEFT BREAST IN FEMALE: Primary | ICD-10-CM

## 2022-07-14 PROCEDURE — 3077F PR MOST RECENT SYSTOLIC BLOOD PRESSURE >= 140 MM HG: ICD-10-PCS | Mod: CPTII,S$GLB,, | Performed by: SURGERY

## 2022-07-14 PROCEDURE — 3061F NEG MICROALBUMINURIA REV: CPT | Mod: CPTII,S$GLB,, | Performed by: SURGERY

## 2022-07-14 PROCEDURE — 3288F FALL RISK ASSESSMENT DOCD: CPT | Mod: CPTII,S$GLB,, | Performed by: SURGERY

## 2022-07-14 PROCEDURE — 4010F ACE/ARB THERAPY RXD/TAKEN: CPT | Mod: CPTII,S$GLB,, | Performed by: SURGERY

## 2022-07-14 PROCEDURE — 1159F MED LIST DOCD IN RCRD: CPT | Mod: CPTII,S$GLB,, | Performed by: SURGERY

## 2022-07-14 PROCEDURE — 99205 OFFICE O/P NEW HI 60 MIN: CPT | Mod: S$GLB,,, | Performed by: SURGERY

## 2022-07-14 PROCEDURE — 3066F PR DOCUMENTATION OF TREATMENT FOR NEPHROPATHY: ICD-10-PCS | Mod: CPTII,S$GLB,, | Performed by: STUDENT IN AN ORGANIZED HEALTH CARE EDUCATION/TRAINING PROGRAM

## 2022-07-14 PROCEDURE — 3066F PR DOCUMENTATION OF TREATMENT FOR NEPHROPATHY: ICD-10-PCS | Mod: CPTII,S$GLB,, | Performed by: SURGERY

## 2022-07-14 PROCEDURE — 3051F HG A1C>EQUAL 7.0%<8.0%: CPT | Mod: CPTII,S$GLB,, | Performed by: SURGERY

## 2022-07-14 PROCEDURE — 1101F PT FALLS ASSESS-DOCD LE1/YR: CPT | Mod: CPTII,S$GLB,, | Performed by: SURGERY

## 2022-07-14 PROCEDURE — 1159F PR MEDICATION LIST DOCUMENTED IN MEDICAL RECORD: ICD-10-PCS | Mod: CPTII,S$GLB,, | Performed by: SURGERY

## 2022-07-14 PROCEDURE — 3066F NEPHROPATHY DOC TX: CPT | Mod: CPTII,S$GLB,, | Performed by: SURGERY

## 2022-07-14 PROCEDURE — 3051F PR MOST RECENT HEMOGLOBIN A1C LEVEL 7.0 - < 8.0%: ICD-10-PCS | Mod: CPTII,S$GLB,, | Performed by: STUDENT IN AN ORGANIZED HEALTH CARE EDUCATION/TRAINING PROGRAM

## 2022-07-14 PROCEDURE — 3061F PR NEG MICROALBUMINURIA RESULT DOCUMENTED/REVIEW: ICD-10-PCS | Mod: CPTII,S$GLB,, | Performed by: SURGERY

## 2022-07-14 PROCEDURE — 99205 OFFICE O/P NEW HI 60 MIN: CPT | Mod: S$GLB,,, | Performed by: STUDENT IN AN ORGANIZED HEALTH CARE EDUCATION/TRAINING PROGRAM

## 2022-07-14 PROCEDURE — 3077F SYST BP >= 140 MM HG: CPT | Mod: CPTII,S$GLB,, | Performed by: SURGERY

## 2022-07-14 PROCEDURE — 3008F PR BODY MASS INDEX (BMI) DOCUMENTED: ICD-10-PCS | Mod: CPTII,S$GLB,, | Performed by: SURGERY

## 2022-07-14 PROCEDURE — 99205 PR OFFICE/OUTPT VISIT, NEW, LEVL V, 60-74 MIN: ICD-10-PCS | Mod: S$GLB,,, | Performed by: STUDENT IN AN ORGANIZED HEALTH CARE EDUCATION/TRAINING PROGRAM

## 2022-07-14 PROCEDURE — 3061F NEG MICROALBUMINURIA REV: CPT | Mod: CPTII,S$GLB,, | Performed by: STUDENT IN AN ORGANIZED HEALTH CARE EDUCATION/TRAINING PROGRAM

## 2022-07-14 PROCEDURE — 3078F PR MOST RECENT DIASTOLIC BLOOD PRESSURE < 80 MM HG: ICD-10-PCS | Mod: CPTII,S$GLB,, | Performed by: SURGERY

## 2022-07-14 PROCEDURE — 1101F PR PT FALLS ASSESS DOC 0-1 FALLS W/OUT INJ PAST YR: ICD-10-PCS | Mod: CPTII,S$GLB,, | Performed by: SURGERY

## 2022-07-14 PROCEDURE — 1160F PR REVIEW ALL MEDS BY PRESCRIBER/CLIN PHARMACIST DOCUMENTED: ICD-10-PCS | Mod: CPTII,S$GLB,, | Performed by: STUDENT IN AN ORGANIZED HEALTH CARE EDUCATION/TRAINING PROGRAM

## 2022-07-14 PROCEDURE — 3008F BODY MASS INDEX DOCD: CPT | Mod: CPTII,S$GLB,, | Performed by: SURGERY

## 2022-07-14 PROCEDURE — 4010F ACE/ARB THERAPY RXD/TAKEN: CPT | Mod: CPTII,S$GLB,, | Performed by: STUDENT IN AN ORGANIZED HEALTH CARE EDUCATION/TRAINING PROGRAM

## 2022-07-14 PROCEDURE — 3051F PR MOST RECENT HEMOGLOBIN A1C LEVEL 7.0 - < 8.0%: ICD-10-PCS | Mod: CPTII,S$GLB,, | Performed by: SURGERY

## 2022-07-14 PROCEDURE — 1159F PR MEDICATION LIST DOCUMENTED IN MEDICAL RECORD: ICD-10-PCS | Mod: CPTII,S$GLB,, | Performed by: STUDENT IN AN ORGANIZED HEALTH CARE EDUCATION/TRAINING PROGRAM

## 2022-07-14 PROCEDURE — 4010F PR ACE/ARB THEARPY RXD/TAKEN: ICD-10-PCS | Mod: CPTII,S$GLB,, | Performed by: STUDENT IN AN ORGANIZED HEALTH CARE EDUCATION/TRAINING PROGRAM

## 2022-07-14 PROCEDURE — 99205 PR OFFICE/OUTPT VISIT, NEW, LEVL V, 60-74 MIN: ICD-10-PCS | Mod: S$GLB,,, | Performed by: SURGERY

## 2022-07-14 PROCEDURE — 4010F PR ACE/ARB THEARPY RXD/TAKEN: ICD-10-PCS | Mod: CPTII,S$GLB,, | Performed by: SURGERY

## 2022-07-14 PROCEDURE — 1126F AMNT PAIN NOTED NONE PRSNT: CPT | Mod: CPTII,S$GLB,, | Performed by: SURGERY

## 2022-07-14 PROCEDURE — 99999 PR PBB SHADOW E&M-EST. PATIENT-LVL II: CPT | Mod: PBBFAC,,, | Performed by: STUDENT IN AN ORGANIZED HEALTH CARE EDUCATION/TRAINING PROGRAM

## 2022-07-14 PROCEDURE — 1160F RVW MEDS BY RX/DR IN RCRD: CPT | Mod: CPTII,S$GLB,, | Performed by: STUDENT IN AN ORGANIZED HEALTH CARE EDUCATION/TRAINING PROGRAM

## 2022-07-14 PROCEDURE — 3066F NEPHROPATHY DOC TX: CPT | Mod: CPTII,S$GLB,, | Performed by: STUDENT IN AN ORGANIZED HEALTH CARE EDUCATION/TRAINING PROGRAM

## 2022-07-14 PROCEDURE — 99999 PR PBB SHADOW E&M-EST. PATIENT-LVL V: ICD-10-PCS | Mod: PBBFAC,,, | Performed by: SURGERY

## 2022-07-14 PROCEDURE — 1159F MED LIST DOCD IN RCRD: CPT | Mod: CPTII,S$GLB,, | Performed by: STUDENT IN AN ORGANIZED HEALTH CARE EDUCATION/TRAINING PROGRAM

## 2022-07-14 PROCEDURE — 99999 PR PBB SHADOW E&M-EST. PATIENT-LVL II: ICD-10-PCS | Mod: PBBFAC,,, | Performed by: STUDENT IN AN ORGANIZED HEALTH CARE EDUCATION/TRAINING PROGRAM

## 2022-07-14 PROCEDURE — 3288F PR FALLS RISK ASSESSMENT DOCUMENTED: ICD-10-PCS | Mod: CPTII,S$GLB,, | Performed by: SURGERY

## 2022-07-14 PROCEDURE — 3051F HG A1C>EQUAL 7.0%<8.0%: CPT | Mod: CPTII,S$GLB,, | Performed by: STUDENT IN AN ORGANIZED HEALTH CARE EDUCATION/TRAINING PROGRAM

## 2022-07-14 PROCEDURE — 3078F DIAST BP <80 MM HG: CPT | Mod: CPTII,S$GLB,, | Performed by: SURGERY

## 2022-07-14 PROCEDURE — 99999 PR PBB SHADOW E&M-EST. PATIENT-LVL V: CPT | Mod: PBBFAC,,, | Performed by: SURGERY

## 2022-07-14 PROCEDURE — 3061F PR NEG MICROALBUMINURIA RESULT DOCUMENTED/REVIEW: ICD-10-PCS | Mod: CPTII,S$GLB,, | Performed by: STUDENT IN AN ORGANIZED HEALTH CARE EDUCATION/TRAINING PROGRAM

## 2022-07-14 PROCEDURE — 1126F PR PAIN SEVERITY QUANTIFIED, NO PAIN PRESENT: ICD-10-PCS | Mod: CPTII,S$GLB,, | Performed by: SURGERY

## 2022-07-14 NOTE — PROGRESS NOTES
Subjective:                                                                                    Consult note   Patient ID:    Name: Agustina Crow  : 1950  MRN: [id number]      HPI:   Agustina Crow is a 71 y.o. female presents for evaluation of No chief complaint on file.    Patient was seen as part of a Multi-D clinic with Radiation Oncology,Surgical Oncology and Medical Oncology. Case was also presented at breast cancer tumor conference.     [place] a mass,22 628/ mammogram/ Ultrasound ; Left 1200 2.9cm mass and another 1.2cm mass     22 US biopsy of both left lesions UIQ 1100 and LIQ; Left 1100 UIQ 4cm FN ;Grade 1 IDC ER 98% MN 9% Her 2 2+ FISH neg Ki 67 14%, Left 0800 LIQ 2cm FN hemangioma    MRI pending      Today,patient presented with her supportive family, hading short term memory. Denies any symptoms related to her breast, no back pain, n,v,d, abdominal pain, fever or chills or nipple discharge       Oncology History   Invasive ductal carcinoma of breast, female, left   2022 Initial Diagnosis    Invasive ductal carcinoma of left breast in female     2022 Cancer Staged    Staging form: Breast, AJCC 8th Edition  - Clinical stage from 2022: Stage IB (cT2, cN0, cM0, G1, ER+, MN+, HER2-)            Past Medical History:   Diagnosis Date    Diabetes mellitus     Hypertension        Past Surgical History:   Procedure Laterality Date    SPINAL FUSION         Family History   Problem Relation Age of Onset    Cancer Maternal Aunt         breast       Social History     Socioeconomic History    Marital status:    Tobacco Use    Smoking status: Never Smoker    Smokeless tobacco: Never Used   Substance and Sexual Activity    Alcohol use: Never    Drug use: Never       Review of patient's allergies indicates:   Allergen Reactions    Pcn [penicillins] Anaphylaxis       Review of Systems   Constitutional: Negative for decreased appetite, fever and night sweats.   Eyes: Negative  for blurred vision, double vision, pain and visual disturbance.   Cardiovascular: Negative for chest pain.   Respiratory: Negative for cough and shortness of breath.    Hematologic/Lymphatic: Negative for adenopathy.   Skin: Negative for rash.   Musculoskeletal: Negative for back pain.   Gastrointestinal: Negative for abdominal pain and diarrhea.   Genitourinary: Negative for frequency.   Neurological: Negative for headaches.   Psychiatric/Behavioral: The patient is not nervous/anxious.             Objective:   There were no vitals filed for this visit.     Physical Exam  Constitutional:       General: She is not in acute distress.     Appearance: Normal appearance. She is normal weight.   HENT:      Head: Normocephalic and atraumatic.   Eyes:      General: No scleral icterus.  Cardiovascular:      Rate and Rhythm: Normal rate and regular rhythm.      Heart sounds: Normal heart sounds.   Pulmonary:      Effort: Pulmonary effort is normal.      Breath sounds: Normal breath sounds. No wheezing.   Chest:      Chest wall: No tenderness.       Abdominal:      General: Bowel sounds are normal. There is no distension.      Palpations: Abdomen is soft. There is no mass.   Musculoskeletal:         General: No swelling or tenderness.      Cervical back: Neck supple.   Lymphadenopathy:      Cervical: No cervical adenopathy.   Skin:     Coloration: Skin is not jaundiced or pale.   Neurological:      General: No focal deficit present.      Mental Status: She is oriented to person, place, and time.   Psychiatric:         Mood and Affect: Mood normal.         Behavior: Behavior normal.               Current Outpatient Medications on File Prior to Visit   Medication Sig    aspirin (ECOTRIN) 81 MG EC tablet Take 1 tablet (81 mg total) by mouth once daily.    blood sugar diagnostic Strp 1 strip by Misc.(Non-Drug; Combo Route) route once daily.    blood-glucose meter Misc 1 Device by Misc.(Non-Drug; Combo Route) route once daily.  for 1 day    cyanocobalamin (VITAMIN B-12) 100 MCG tablet Take 100 mcg by mouth once daily.    fenofibrate 160 MG Tab Take 1 tablet (160 mg total) by mouth once daily.    lancets Misc 1 lancet by Misc.(Non-Drug; Combo Route) route once daily.    lisinopriL (PRINIVIL,ZESTRIL) 2.5 MG tablet Take 1 tablet (2.5 mg total) by mouth once daily.    metFORMIN (GLUCOPHAGE) 500 MG tablet Take 1 tablet (500 mg total) by mouth 2 (two) times daily with meals.    sertraline (ZOLOFT) 50 MG tablet Take 0.5 tablets (25 mg total) by mouth once daily.     No current facility-administered medications on file prior to visit.       CBC:  Lab Results   Component Value Date    WBC 5.77 02/08/2022    HGB 13.1 02/08/2022    HCT 40.4 02/08/2022    MCV 91 02/08/2022     02/08/2022       CMP:  Sodium   Date Value Ref Range Status   02/08/2022 140 136 - 145 mmol/L Final     Potassium   Date Value Ref Range Status   02/08/2022 4.8 3.5 - 5.1 mmol/L Final     Chloride   Date Value Ref Range Status   02/08/2022 105 95 - 110 mmol/L Final     CO2   Date Value Ref Range Status   02/08/2022 27 22 - 31 mmol/L Final     Glucose   Date Value Ref Range Status   02/08/2022 183 (H) 70 - 110 mg/dL Final     Comment:     The ADA recommends the following guidelines for fasting glucose:    Normal:       less than 100 mg/dL    Prediabetes:  100 mg/dL to 125 mg/dL    Diabetes:     126 mg/dL or higher       BUN   Date Value Ref Range Status   02/08/2022 22 (H) 7 - 18 mg/dL Final     Creatinine   Date Value Ref Range Status   02/08/2022 1.05 0.50 - 1.40 mg/dL Final     Calcium   Date Value Ref Range Status   02/08/2022 10.1 8.4 - 10.2 mg/dL Final     Total Protein   Date Value Ref Range Status   02/08/2022 6.8 6.0 - 8.4 g/dL Final     Albumin   Date Value Ref Range Status   02/08/2022 4.4 3.5 - 5.2 g/dL Final     Total Bilirubin   Date Value Ref Range Status   02/08/2022 0.4 0.2 - 1.3 mg/dL Final     Alkaline Phosphatase   Date Value Ref Range Status  "  02/08/2022 44 38 - 145 U/L Final     AST   Date Value Ref Range Status   02/08/2022 26 14 - 36 U/L Final     ALT   Date Value Ref Range Status   02/08/2022 16 0 - 35 U/L Final     Anion Gap   Date Value Ref Range Status   02/08/2022 8 8 - 16 mmol/L Final     eGFR if    Date Value Ref Range Status   02/08/2022 >60 >60 mL/min/1.73 m^2 Final     eGFR if non    Date Value Ref Range Status   02/08/2022 53 (A) >60 mL/min/1.73 m^2 Final     Comment:     Calculation used to obtain the estimated glomerular filtration  rate (eGFR) is the CKD-EPI equation.          MRI Breast w/wo Contrast, w/CAD, Bilateral  Narrative: Result:   MRI Breast w/wo Contrast, w/CAD, Bilateral     History:  Patient is 71 y.o. and is seen for recently diagnosed left breast low   grade invasive ductal carcinoma. Evaluate for extent of disease and for   occult contralateral malignancy.    Pathology 7/6/22:  1. BREAST, LEFT, FURTHER DESIGNATED "MASS AT 11:00, 4 CENTIMETERS FROM   NIPPLE," NEEDLE CORE BIOPSY:   --INVASIVE CARCINOMA OF NO SPECIAL TYPE (DUCTAL), GIO HISTOLOGIC    GRADE 1 OUT OF 3 (GLANDULAR   [ACINAR]/TUBULAR DIFFERENTIATION: SCORE 2, NUCLEAR PLEOMORPHISM: SCORE 2,   MITOTIC RATE: SCORE 1),     WITH RARE FOCAL MICROPAPILLARY FEATURES (LESS THAN 2%).   --MULTIPLE MICROCALCIFICATIONS ASSOCIATED WITH NEOPLASM.   --ER+, RI+, HER2-.     2.  BREAST, LEFT, 8:00, 2 CENTIMETERS FROM NIPPLE, NEEDLE CORE BIOPSY:   --HEMANGIOMA.     No family history.      Films Compared:  Compared to: 07/06/2022 US Breast Biopsy with Imaging 1st site Left,   07/06/2022 US Breast Biopsy with Imaging Ea Add, 07/06/2022 Mammo Digital   Diagnostic Left, 06/28/2022 US Breast Left Limited, 06/28/2022 Mammo   Digital Diagnostic Left with Tao, and 06/22/2022 Mammo Digital Screening   Bilat w/ Tao     Technique:  A routine breast MRI was performed with a dedicated breast coil.   Pre-contrast STIR were acquired. Then, pre and post " contrast T1 weighted   fat saturated images were acquired and subtracted with MIP reconstruction.   15 ml of intravenous gadolinium contrast was administered. The study was   reviewed with [employer] software.     Findings:  The breasts have scattered fibroglandular tissue. The background   parenchymal enhancement is mild.    At the left 11:00 posterior depth, there is an irregular, washout   enhancing mass containing a biopsy clip measuring 23 x 27 x 26 mm,   corresponding to the biopsy proven malignancy. This mass is 10 mm from the   medial skin and 9 mm from the pectoralis muscle. There is no skin, nipple,   or chest wall involvement.      At the left 8:00 anterior depth, there is a below threshold enhancing oval   mass with a biopsy clip measuring 11 x 7 x 13 mm, corresponding to the   hemangioma.     No suspicious enhancing lesions are seen in the right breast.     No abnormal internal mammary or axillary lymph nodes are seen.   Impression: Biopsy proven malignancy at the left 11:00 posterior depth measuring 27   mm.     BI-RADS Category:   Overall: 6 - Known Biopsy-Proven Malignancy     Recommendation:  Surgical Consult is recommended for the left breast.       ECOG SCORE    1 - Restricted in strenuous activity-ambulatory and able to carry out work of a light nature          All pertinent labs and imaging reviewed. As well as outside records     Assessment:       1. Invasive ductal carcinoma of breast, female, left    2. Chronic kidney disease, stage 3a    3. Uncontrolled type 2 diabetes mellitus with hyperglycemia    4. Mixed hyperlipidemia            Stage IB IDC, ER/NC+,HER-2 negative    We discussed in details disease, staging, prognosis, as well as surgical options and treatment options after/before surgery, no indication for neoadjuvant chemo.      Patient is candidate for adjuvant endocrine therapy (>60) , post menopausal, will benefit from AI for 5 years., pending MRI of the breast, less likely patient  will benefit from chemotherapy especially given her other co-morbidities . Will need DEXA scan     Hyperlipidemia:  Will need to monitor annually while on AI     DM  Stable, controlled, following up with PCP     Plan:     Invasive ductal carcinoma of breast, female, left  -     Ambulatory referral/consult to Hematology / Oncology    Chronic kidney disease, stage 3a    Uncontrolled type 2 diabetes mellitus with hyperglycemia    Mixed hyperlipidemia         Patient queried and all questions were answered.    Plan was discussed with the patient and her family at length, and they verbalized understanding.      Recommend COVID guidelines being followed   Recommend  exercise, nutrition and weight management and health maintenance activities and follow-up with PCPs recommendations     Signed:  Aleena Zhang MD  Hematology and Oncology  [facility]  [facility] [place] [facility]

## 2022-07-14 NOTE — NURSING
Met with patient and daughter in multi disciplinary clinic today.  Explained my role as navigator.  Reviewed plan of care and answered questions.  Pt scheduled for surgery on 8/2/22.  Dr. Zhang will see her 2 -3 weeks post surgery.  Will call pt to set up that appt after surgery.  My contact information was provided and pt was encouraged to call with any questions or concerns.  Pt and daughter verbalized understanding.

## 2022-07-19 ENCOUNTER — TELEPHONE (OUTPATIENT)
Dept: HEMATOLOGY/ONCOLOGY | Facility: CLINIC | Age: 72
End: 2022-07-19
Payer: MEDICARE

## 2022-07-19 ENCOUNTER — PATIENT MESSAGE (OUTPATIENT)
Dept: HEMATOLOGY/ONCOLOGY | Facility: CLINIC | Age: 72
End: 2022-07-19
Payer: MEDICARE

## 2022-07-19 NOTE — TELEPHONE ENCOUNTER
I spoke with Agustina and informed her of the need to schedule a pre and post op PT appt, recommended by Dr. Krueger. I explained what the appt is designed to treat and what the consult will consist of. She voiced understanding and verbalized acceptance of appt on 7/29. Location was reviewed and message sent to portal if she has any further questions.      ----- Message from Danyelle Hugo RN sent at 7/18/2022  4:38 PM CDT -----  Evan Hickey,  Got another one!  She is scheduled for left lumpectomy with left sentinel node biopsy on 8/2.  Just needs prehab.    Thanks so much!  Danyelle

## 2022-07-25 ENCOUNTER — OFFICE VISIT (OUTPATIENT)
Dept: NEUROLOGY | Facility: CLINIC | Age: 72
End: 2022-07-25
Payer: MEDICARE

## 2022-07-25 ENCOUNTER — TUMOR BOARD CONFERENCE (OUTPATIENT)
Dept: SURGERY | Facility: CLINIC | Age: 72
End: 2022-07-25
Payer: MEDICARE

## 2022-07-25 DIAGNOSIS — R41.3 MEMORY LOSS: Primary | ICD-10-CM

## 2022-07-25 PROCEDURE — 3061F NEG MICROALBUMINURIA REV: CPT | Mod: CPTII,95,, | Performed by: STUDENT IN AN ORGANIZED HEALTH CARE EDUCATION/TRAINING PROGRAM

## 2022-07-25 PROCEDURE — 3066F NEPHROPATHY DOC TX: CPT | Mod: CPTII,95,, | Performed by: STUDENT IN AN ORGANIZED HEALTH CARE EDUCATION/TRAINING PROGRAM

## 2022-07-25 PROCEDURE — 3051F HG A1C>EQUAL 7.0%<8.0%: CPT | Mod: CPTII,95,, | Performed by: STUDENT IN AN ORGANIZED HEALTH CARE EDUCATION/TRAINING PROGRAM

## 2022-07-25 PROCEDURE — 99499 NO LOS: ICD-10-PCS | Mod: 95,,, | Performed by: STUDENT IN AN ORGANIZED HEALTH CARE EDUCATION/TRAINING PROGRAM

## 2022-07-25 PROCEDURE — 4010F ACE/ARB THERAPY RXD/TAKEN: CPT | Mod: CPTII,95,, | Performed by: STUDENT IN AN ORGANIZED HEALTH CARE EDUCATION/TRAINING PROGRAM

## 2022-07-25 PROCEDURE — 3066F PR DOCUMENTATION OF TREATMENT FOR NEPHROPATHY: ICD-10-PCS | Mod: CPTII,95,, | Performed by: STUDENT IN AN ORGANIZED HEALTH CARE EDUCATION/TRAINING PROGRAM

## 2022-07-25 PROCEDURE — 3051F PR MOST RECENT HEMOGLOBIN A1C LEVEL 7.0 - < 8.0%: ICD-10-PCS | Mod: CPTII,95,, | Performed by: STUDENT IN AN ORGANIZED HEALTH CARE EDUCATION/TRAINING PROGRAM

## 2022-07-25 PROCEDURE — 3061F PR NEG MICROALBUMINURIA RESULT DOCUMENTED/REVIEW: ICD-10-PCS | Mod: CPTII,95,, | Performed by: STUDENT IN AN ORGANIZED HEALTH CARE EDUCATION/TRAINING PROGRAM

## 2022-07-25 PROCEDURE — 4010F PR ACE/ARB THEARPY RXD/TAKEN: ICD-10-PCS | Mod: CPTII,95,, | Performed by: STUDENT IN AN ORGANIZED HEALTH CARE EDUCATION/TRAINING PROGRAM

## 2022-07-25 PROCEDURE — 99499 UNLISTED E&M SERVICE: CPT | Mod: 95,,, | Performed by: STUDENT IN AN ORGANIZED HEALTH CARE EDUCATION/TRAINING PROGRAM

## 2022-07-25 NOTE — PROGRESS NOTES
Ms. Crow and her daughter were present for their scheduled virtual intake visit, ahead of the patient's scheduled testing appointment for 8/8/22. However, Ms. Crow was recently scheduled for surgery on 8/2/22. It was decided to postpone her neuropsychological evaluation until September to allow for post-surgical recovery.

## 2022-07-26 NOTE — PROGRESS NOTES
Interdisciplinary Breast Cancer Conference    Agustina Crow    Female    Date Presented to Tumor Board: 07/25/22    Presenting Hospital / Clinic: [facility]    Tumor Laterality: Left    Breast Tumor Site: UIQ    Presenter: Monie Krueger    Reason for Consultation: Initial Presentation    Specialties Present: Medical Oncology;Radiation Oncology;Surgical Oncology;Research;Navigation;Pathology;Radiology    Patient Status: a new patient    Treatment to Date: None    Clinical Trial Eligibility: None available    ER: Positive    DC: Positive    Her2: Negative    Cancer Staging  Invasive ductal carcinoma of breast, female, left  Staging form: Breast, AJCC 8th Edition  - Clinical stage from 7/14/2022: Stage IB (cT2, cN0, cM0, G1, ER+, DC+, HER2-) - Signed by Monie Krueger MD on 7/14/2022           Presentation at Cancer Conference: Prospective        Recommended Therapy:  Genetic testing  Surgery  Radiation   Endocrine Therapy  Recommendations pending final path

## 2022-07-27 ENCOUNTER — OFFICE VISIT (OUTPATIENT)
Dept: RADIATION ONCOLOGY | Facility: CLINIC | Age: 72
End: 2022-07-27
Payer: MEDICARE

## 2022-07-27 VITALS
TEMPERATURE: 99 F | HEIGHT: 64 IN | SYSTOLIC BLOOD PRESSURE: 102 MMHG | HEART RATE: 68 BPM | WEIGHT: 123.88 LBS | RESPIRATION RATE: 16 BRPM | DIASTOLIC BLOOD PRESSURE: 52 MMHG | OXYGEN SATURATION: 100 % | BODY MASS INDEX: 21.15 KG/M2

## 2022-07-27 DIAGNOSIS — C50.912 INVASIVE DUCTAL CARCINOMA OF BREAST, FEMALE, LEFT: ICD-10-CM

## 2022-07-27 PROCEDURE — 3051F PR MOST RECENT HEMOGLOBIN A1C LEVEL 7.0 - < 8.0%: ICD-10-PCS | Mod: CPTII,S$GLB,, | Performed by: RADIOLOGY

## 2022-07-27 PROCEDURE — 3008F PR BODY MASS INDEX (BMI) DOCUMENTED: ICD-10-PCS | Mod: CPTII,S$GLB,, | Performed by: RADIOLOGY

## 2022-07-27 PROCEDURE — 3066F NEPHROPATHY DOC TX: CPT | Mod: CPTII,S$GLB,, | Performed by: RADIOLOGY

## 2022-07-27 PROCEDURE — 3078F DIAST BP <80 MM HG: CPT | Mod: CPTII,S$GLB,, | Performed by: RADIOLOGY

## 2022-07-27 PROCEDURE — 99999 PR PBB SHADOW E&M-EST. PATIENT-LVL IV: ICD-10-PCS | Mod: PBBFAC,,, | Performed by: RADIOLOGY

## 2022-07-27 PROCEDURE — 3288F PR FALLS RISK ASSESSMENT DOCUMENTED: ICD-10-PCS | Mod: CPTII,S$GLB,, | Performed by: RADIOLOGY

## 2022-07-27 PROCEDURE — 1159F MED LIST DOCD IN RCRD: CPT | Mod: CPTII,S$GLB,, | Performed by: RADIOLOGY

## 2022-07-27 PROCEDURE — 1159F PR MEDICATION LIST DOCUMENTED IN MEDICAL RECORD: ICD-10-PCS | Mod: CPTII,S$GLB,, | Performed by: RADIOLOGY

## 2022-07-27 PROCEDURE — 3008F BODY MASS INDEX DOCD: CPT | Mod: CPTII,S$GLB,, | Performed by: RADIOLOGY

## 2022-07-27 PROCEDURE — 1101F PR PT FALLS ASSESS DOC 0-1 FALLS W/OUT INJ PAST YR: ICD-10-PCS | Mod: CPTII,S$GLB,, | Performed by: RADIOLOGY

## 2022-07-27 PROCEDURE — 4010F ACE/ARB THERAPY RXD/TAKEN: CPT | Mod: CPTII,S$GLB,, | Performed by: RADIOLOGY

## 2022-07-27 PROCEDURE — 3061F PR NEG MICROALBUMINURIA RESULT DOCUMENTED/REVIEW: ICD-10-PCS | Mod: CPTII,S$GLB,, | Performed by: RADIOLOGY

## 2022-07-27 PROCEDURE — 99999 PR PBB SHADOW E&M-EST. PATIENT-LVL IV: CPT | Mod: PBBFAC,,, | Performed by: RADIOLOGY

## 2022-07-27 PROCEDURE — 3074F SYST BP LT 130 MM HG: CPT | Mod: CPTII,S$GLB,, | Performed by: RADIOLOGY

## 2022-07-27 PROCEDURE — 1101F PT FALLS ASSESS-DOCD LE1/YR: CPT | Mod: CPTII,S$GLB,, | Performed by: RADIOLOGY

## 2022-07-27 PROCEDURE — 3066F PR DOCUMENTATION OF TREATMENT FOR NEPHROPATHY: ICD-10-PCS | Mod: CPTII,S$GLB,, | Performed by: RADIOLOGY

## 2022-07-27 PROCEDURE — 4010F PR ACE/ARB THEARPY RXD/TAKEN: ICD-10-PCS | Mod: CPTII,S$GLB,, | Performed by: RADIOLOGY

## 2022-07-27 PROCEDURE — 99205 OFFICE O/P NEW HI 60 MIN: CPT | Mod: S$GLB,,, | Performed by: RADIOLOGY

## 2022-07-27 PROCEDURE — 99205 PR OFFICE/OUTPT VISIT, NEW, LEVL V, 60-74 MIN: ICD-10-PCS | Mod: S$GLB,,, | Performed by: RADIOLOGY

## 2022-07-27 PROCEDURE — 3061F NEG MICROALBUMINURIA REV: CPT | Mod: CPTII,S$GLB,, | Performed by: RADIOLOGY

## 2022-07-27 PROCEDURE — 3051F HG A1C>EQUAL 7.0%<8.0%: CPT | Mod: CPTII,S$GLB,, | Performed by: RADIOLOGY

## 2022-07-27 PROCEDURE — 3078F PR MOST RECENT DIASTOLIC BLOOD PRESSURE < 80 MM HG: ICD-10-PCS | Mod: CPTII,S$GLB,, | Performed by: RADIOLOGY

## 2022-07-27 PROCEDURE — 3074F PR MOST RECENT SYSTOLIC BLOOD PRESSURE < 130 MM HG: ICD-10-PCS | Mod: CPTII,S$GLB,, | Performed by: RADIOLOGY

## 2022-07-27 PROCEDURE — 3288F FALL RISK ASSESSMENT DOCD: CPT | Mod: CPTII,S$GLB,, | Performed by: RADIOLOGY

## 2022-07-27 NOTE — PROGRESS NOTES
07/26/2022    [facility]   Radiation Oncology Consultation    ASSESSMENT  This is a 71 y.o. y/o female with StageIB (T2, N0, M0, Grade 1, ER+/MN+/HER2-) Invasive ductal carcinoma of the LEFT breast. She is seen for discussion of treatment options.       PLAN     Treatment options were discussed with the patient including breast conservation.   We discussed the goals of treatment to be curative.   The risks, benefits, scheduling, alternatives to and rationale of radiation therapy were explained in detail.     After this discussion, she elected to proceed with adjuvant LEFT breast radiation therapy after lumpectomy.     Informed Consent was obtained and all questions were answered to the best of my ability   Surgery scheduled for 8/2/22   A CT simulation will be performed on 8/25/22 to begin the planning process for the patient's radiation therapy.      Wishes to start radiation therapy on/after 9/7/22 (medical procedure all day 9/6)   She was given our contact information, and she was told that she could call our clinic at any time if she has any questions or concerns.      Radiation Treatment Details:    We plan to treat LEFT breast to a dose of 40.05 Gy in 15 fractions at 2.67 Gy per fraction delivered daily with DIBH, likely followed by a boost to an additional 10GY (penidng margins at final pathology)   We will utilize a(n) 3D with DIBH technique.    We will utilize daily CT or orthogonal image guidance due to the need for accurate daily patient alignment to treat the target volumes accurately and avoid radiation overdose to multiple regional organs at risk since we are treating the patient with complex target volumes with multiple steep isodose gradients.        No chief complaint on file.      HPI: The patient is a 71 year old female who self-palpated a mass in her LEFT upper outer quadrant sometime last year.      6/22/22 Bilateral Screening mammogram:  Left 12:00 2.9cm  mass, posterior depth, and second lower inner quadrant 1.2cm mass.    6/28/22 Diagnostic LEFT mammogram/ultrasound: No axillary lymphadenopathy.  12:00 2.9cm round, spiculated mass 12:00; 1.2cm oval mass in lower inner quadrant  microlobulated mass at 8:00    7/6/22 LEFT 11:00  biopsy: invasive ductal carcinoma, G1, with rare micropapillary features; ER+/NC+/Her2-    7/6/22 LEFT 8:00 biopsy: hemangioma    7/18/22 MRI Breasts: LEFT 2.7cm 11:00 irregular mass; 1.1cm hemangioma at 8:00.  No other suspicious findings.    Patient is scheduled for lumpectomy and sentinel node on 8/2/22.  She presents today for discussion of adjuvant radiation therapy.    Possibility of pregnancy: No  History of prior irradiation: No  History of prior systemic anti-cancer therapy: No  History of collagen vascular disease: No  Implanted electronic device (pacer/defib/nerve stimulator): No      Past Medical History:   Diagnosis Date    Diabetes mellitus     Hypertension        Past Surgical History:   Procedure Laterality Date    SPINAL FUSION         Social History     Tobacco Use    Smoking status: Never Smoker    Smokeless tobacco: Never Used   Substance Use Topics    Alcohol use: Never    Drug use: Never       Cancer-related family history includes Cancer in her maternal aunt.    Current Outpatient Medications on File Prior to Visit   Medication Sig Dispense Refill    aspirin (ECOTRIN) 81 MG EC tablet Take 1 tablet (81 mg total) by mouth once daily.  0    blood sugar diagnostic Strp 1 strip by Misc.(Non-Drug; Combo Route) route once daily. 90 strip 3    blood-glucose meter Misc 1 Device by Misc.(Non-Drug; Combo Route) route once daily. for 1 day 1 each 0    cyanocobalamin (VITAMIN B-12) 100 MCG tablet Take 100 mcg by mouth once daily.      fenofibrate 160 MG Tab Take 1 tablet (160 mg total) by mouth once daily. 90 tablet 2    lancets Misc 1 lancet by Misc.(Non-Drug; Combo Route) route once daily. 90 each 3    lisinopriL  (PRINIVIL,ZESTRIL) 2.5 MG tablet Take 1 tablet (2.5 mg total) by mouth once daily. 90 tablet 1    metFORMIN (GLUCOPHAGE) 500 MG tablet Take 1 tablet (500 mg total) by mouth 2 (two) times daily with meals. 180 tablet 2    sertraline (ZOLOFT) 50 MG tablet Take 0.5 tablets (25 mg total) by mouth once daily. 90 tablet 3     No current facility-administered medications on file prior to visit.       Review of patient's allergies indicates:   Allergen Reactions    Pcn [penicillins] Anaphylaxis       Review of Systems   Constitutional: Negative.    HENT: Negative.    Eyes: Negative.    Respiratory: Negative.    Cardiovascular: Negative.    Gastrointestinal: Negative.    Genitourinary: Negative.    Musculoskeletal: Negative.    Skin: Negative.    Neurological: Negative.    Endo/Heme/Allergies: Negative.    Psychiatric/Behavioral: Positive for memory loss.        Vital Signs: There were no vitals taken for this visit.    ECOG Performance Status: 1 - Ambulates, capable of light work    Physical Exam  Vitals reviewed.   Constitutional:       General: She is not in acute distress.     Appearance: Normal appearance. She is not ill-appearing or toxic-appearing.   HENT:      Head: Normocephalic and atraumatic.      Nose: Nose normal.   Eyes:      Extraocular Movements: Extraocular movements intact.      Conjunctiva/sclera: Conjunctivae normal.      Pupils: Pupils are equal, round, and reactive to light.   Pulmonary:      Effort: Pulmonary effort is normal.   Chest:   Breasts:      Right: Normal. No axillary adenopathy or supraclavicular adenopathy.      Left: Mass (x2) present. No swelling, tenderness, axillary adenopathy or supraclavicular adenopathy.         Musculoskeletal:         General: Normal range of motion.      Cervical back: Normal range of motion.   Lymphadenopathy:      Upper Body:      Right upper body: No supraclavicular, axillary or pectoral adenopathy.      Left upper body: No supraclavicular, axillary or  pectoral adenopathy.   Skin:     General: Skin is warm.   Neurological:      General: No focal deficit present.      Mental Status: She is alert and oriented to person, place, and time.      Cranial Nerves: No cranial nerve deficit.      Gait: Gait normal.   Psychiatric:         Mood and Affect: Mood normal.         Behavior: Behavior normal.         Thought Content: Thought content normal.         Judgment: Judgment normal.            Imaging: I have personally reviewed the patient's available images and reports and summarized pertinent findings above in HPI.     Pathology: I have personally reviewed the patient's available pathology and summarized pertinent findings above in HPI.     This case was discussed with Dr. Krueger      - Thank you for allowing me to participate in the care of your patient.    Precious Rubio MD

## 2022-07-29 ENCOUNTER — CLINICAL SUPPORT (OUTPATIENT)
Dept: REHABILITATION | Facility: HOSPITAL | Age: 72
End: 2022-07-29
Attending: SURGERY
Payer: MEDICARE

## 2022-07-29 DIAGNOSIS — Z91.89 AT RISK FOR LYMPHEDEMA: Primary | ICD-10-CM

## 2022-07-29 DIAGNOSIS — C50.912 INVASIVE DUCTAL CARCINOMA OF LEFT BREAST IN FEMALE: ICD-10-CM

## 2022-07-29 PROCEDURE — 97161 PT EVAL LOW COMPLEX 20 MIN: CPT | Mod: PN

## 2022-07-29 NOTE — PATIENT INSTRUCTIONS
[facility]  [address] [place] [address]  [phone]    [facility] Medical Equipment and Supplies  [id number] [address], [place] [address] [phone]      Recommended Compression Arm Sleeve  Jobst Renetta Lite 15-20mmHg Size small/regular length  Jobst Renetta Lite gauntlet 15-20 mmHg small        Post Operative Breast Cancer Surgery Exercises    After surgery your shoulder and chest may feel tight and sore. Movement may cause stretching across your chest, axilla (armpit), and the incision site limiting your ability to raise your arm. Exercise will be extremely important in preventing swelling and in helping you regain movement, strength, and use of your arm.     Your post-operative exercise program can be divided into three stages. Your surgeon will inform you when to move to the next stage.     STAGE TIME PURPOSE EXERCISE   I Post-op day 1 to 4  (Drains are in) To prevent and/or reduce swelling - Positioning  - Hand and arm precautions   II Post-op day 5 to 14  (After drains are removed) To provide gentle movement without much stretching  - Shoulder shrugs  - Shoulder retraction   III Post-op day 15-6 wks  (After suture are removed) To stretch and regain full motion - Wall ladder  - Range of motion exercises  - Wand exercises       These exercises are general guideline for use following a mastectomy. Please consult your physician for additional information. Of a tissue expander has been placed, or if you have had reconstruction, you must get approval from your physician before beginning exercises.   Check with your physician prior to beginning a new stage.  Avoid elbows above shoulders until after post-op day 14.    STAGE 1      Abdominal Breathing Exercises      Get comfortable and relax your neck and shoulders. You can sit or lie down. Place one hand on your upper chest and place the other hand on your belly button. Use your hands to feel the movements as you  breathe in and out.  Take a deep breath in through your nose and feel the hand on your stomach move out. Do not let your shoulders move up. The hand on your chest should not move.   Breathe out slow and gentle through your mouth. Pucker your lips as if you were going to whistle or blow out a candle. The hand on your stomach should move in as you breathe out. Breathe out as long as you can until all the air is gone.   To help keep the lymphatic system moving well, practice two breaths every hour using the steps for abdominal breathing exercises.        Positioning    Several times a day, elevate your arm on pillows so your hand is above the level of your heart. This position will allow gravity to drain excess fluids out of your hand and arm. Try to place pillows under involved arm while in sitting position or while laying down.                STAGE 2    Shoulder Shrugs Shoulder Retraction    While sitting with arms supported, shrug both of your shoulder and then relax. Repeat 10 times, 3 times a day.   While sitting or standing, pull both shoulder back, bringing shoulder blades together. Repeat 10 times,   3 times a day.        STAGE 3  Range of Motion Exercises    To retain full motion of your shoulder, it must be moved in all planes, several times a day. Begin arm range of motion exercises with 10 reps of each, 2 times a day. Progress to 3 x 10 reps, as tolerated 2 times a day.    Wand - Shoulder Flexion       Lay on your back in a comfortable position. Raise both arms overhead, then bring back down by your side.        Wand - Shoulder Abduction       Lay on your back in a comfortable position. Raise both arms out to your side, then bring back down by your side.        Wand - Shoulder External Rotation      Lay on your back in a comfortable position. Position your arms as pictured below, with your elbows bent and your hand in the arm. Slowly lower your hand down, then bring back up.        Wand - Shoulder Flexion       Hold onto a cane or broom with both hands approximately 14 inches apart with arms straight at your side. Raise the cane forward and upwards as far as you can go or until your elbow is near your ear. Then gradually return to the original position.        Wand - Shoulder Abduction      Hold onto the ends of a cane with both hands, then raise the involved arm sideways and upward over your head with the aid of the cane and the good arm. Keep trunk straight! Then gradually return to original position.        Wand - Shoulder External Rotation      Holding onto a cane with both hands approximately 14 inches apart at shoulder level, turn the cane clockwise and then counterclockwise as far as possible.        Wall Climbing - Shoulder Flexion      Stand facing the wall and extend the involved arm directly in front of you so that your fingertips touch the wall (at arm's length away from the wall). Creep up the side of the wall with your fingertips, taking a step toward the wall as you reach higher and higher up the wall. Repeat this procedure going down the wall, but taking a step backward this time. Begin with 5 wall climbs, then progress to 10 reps at a time, 1-2 times a day.        Wall Climbing - Shoulder Abduction     ([email or web address]  home-exercises-for-frozen-shoulder/) Repeat the above procedure, but this time position yourself perpendicular (at a right angle) to the wall so that the involved arm will extend sideways up the wall. Keep your trunk straight, not leaning toward the wall or shrugging your shoulder. Place a rosie (tape) on the wall each week to see if you are making progress. Begin with 5 wall climbs, then progress to 10 reps at a time, 1-2 times a day.        PRECAUTIONS    As a result of the removal of lymph nodes and vessels, your arm is susceptible to infection and swelling. A hot, reddened or swollen arm denotes the presence of infection and should be brought to the attention  of your physician immediately. Try to avoid cuts, scratches, pinpricks, hangnails, sunburns, insect bites, chemical irritation, and irritating detergent.    The following are helpful hints:    Avoid injections, blood draws, blood pressure, and IVs to be done to your involved arm. If you have undergone axillary dissection, then consider using the opposite only for injections, blood draws, blood pressure, and IVs.  Prevent chapping of your hand and arm by applying lotion liberally several times a day. Recommend Eucerin lotion, No massages to affected upper extremity if you have had lymph nodes dissection or radiation to lymph nodes.   Do not cut nails too close to the quick. Do not cut or pick your cuticles. Use cuticle cream or remover.  Avoid carrying heavy objects (over 5 lbs) with your involved arm.   Protect your arm from burns with small electrical appliances such as irons, curling irons, and frying pans.   Wear sunscreen in the sun.  Apply insect repellent when going to areas where you might be exposed to insect bites.   Use an electric razor for shaving.   Wear loose fitting work/rubber gloves when washing dishes, using , or using steel wool.  Wear garden gloves when gardening or arranging thorn flowers.  Do not wear tight jewelry on your affected arm.  Do not wear narrow tight straps on clothing or under garments.    IMPORTANT    If you do cut, burn, or manley the skin, wash the area and use an antiseptic. If it becomes red, warm, or unusually hard or swollen, contact your physician immediately.     If there is swelling without redness, increased warmth or hardness, the positioning and pumping exercises as described above can help decrease the swelling. Position your hand higher than the elbow, elbow higher than the shoulder, and shoulder higher than your heart. Maintain this position for 30 minutes. Repeat as necessary.     OCCUPATIONAL AND PHYSICAL THERAPY    Most women have enough motion in their  involved arm to perform normal activities within 2 months after surgery. Any post-operative swelling or pain has probably disappeared. However; some women may develop persistent stiffness, weakness, pain, or swelling. If this is your experience, call your physician. He or she may recommend that a physical or occupational therapist work with you to minimize your problems.     CONCLUSION    Besides your exercise program, your daily routine at home can be continued and will be beneficial toward your recovery:  Getting dressed every day  Daily grooming  Cooking and light housework  Being active with family and friends    REMINDER  Pace yourself!  Strive for a balance between work and relaxation  Avoid excessive pulling and lifting which may strain your shoulder

## 2022-07-29 NOTE — PROGRESS NOTES
[facility]  Lymphedema Physical Therapy   PRE-OP EVALUATION    Visit Date: 7/29/2022     Name: Agustina Crow  [facility] Number: [id number]  Therapy Diagnosis:   Encounter Diagnoses   Name Primary?    Invasive ductal carcinoma of left breast in female     At risk for lymphedema Yes     Physician: Monie Krueger MD  Physician Orders: PT Eval and Treat  Medical Diagnosis: Invasive ductal carcinoma of left breast  Chart review pertaining to cancer hx:   Invasive ductal carcinoma of left breast in female  Staging form: Breast, AJCC 8th Edition  - Clinical stage from 7/14/2022: Stage IB (cT2, cN0, cM0, G1, ER+, MI+, HER2-) - Signed by Monie Krueger MD on 7/14/2022   Left UIQ 11:00 4 cm from nipple 2.9 cm grade 1 IDC.  Strongly ER positive.  Ki 14.  Lower inner quadrant 2 cm hemangioma by core biopsy.      Evaluation Date: 7/29/2022  Authorization: pending  Plan of Care: PT f/u after completion of radiation    Visit #: 1 / pending  PTA visit: n/a  Time In: 01:00 PM  Time Out: 01:55 Pm  Total Billable Time: 55 minutes    Precautions: Standard and cancer    Subjective   Patients daughter present with her for appointment.  Pt reports: having surgery next week.   Dx: Invasive ductal carcinoma of left breast  Surgery date: 08/02/2022 left lumpectomy with left sentinel node biopsy.  Radiation: Will be having 3 weeks (15 sessions), start date to be determined. Possibly on or after 09/07/2022  Chemotherapy: pending    Pain  Location: none  Current 0/10, Worst 0/10, Best 0/10     Bone graft, cervical    Past Medical History:   Past Medical History:   Diagnosis Date    Anticoagulant long-term use     Diabetes mellitus     Hypertension        Past Surgical History:  has a past surgical history that includes Spinal fusion.    Medications: has a current medication list which includes the following prescription(s): aspirin, blood sugar diagnostic, blood-glucose meter, cyanocobalamin,  fenofibrate, lancets, lisinopril, metformin, and sertraline.    Allergies:   Review of patient's allergies indicates:   Allergen Reactions    Pcn [penicillins] Anaphylaxis          Hand Dominance: Right  Diet: well rounded, eating fruits and vegis   Habitus: well developed, well nourished    Prior Therapy/Previous treatment included: No PT this calendar year.   DME owned: none  Social History: lives with their family (daughter and )  Place of Residence (Steps/Adaptations): two story home, stays on first floor. 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  Prior Exercise Routine: walks at home, walking her dogs  Prior Level of Function: independnet  Current Level of Function: see above    Patient's Goals: know what to expect    Objective     Mental Status: Alert/Oriented    Observations  Posture: slight rounded shoulders  Joint Integrity: WFLs bilateral  Skin Integrity: intact bilateral  Edema: none bilateral    Sensation  Light Touch: intact bilateral  Proprioception: intact bilateral    A/PROM  (L) UE: WFLs  (R) UE: WFLs    STRENGTH  (L) UE: WFLs  (R) UE: WFLs      Baseline Measurements of BUEs  LANDMARK LEFT UE (cm) RIGHT UE (cm)   W + 16 inches 26.8 27.6   W + 12 inches 22.4 22.3   Elbow 22.9 22.9   W +6  inches 19.8 21.2   W + 4 inches 17.3 18.9   Wrist 14.4 14.6   DPC 17.7 18.4   IP Thumb 5.5 5.8   Arm length 41 cm   Garments recommended: Jobst Patterson Lite 15-20mmHg size small/regular length  Pt to wear arm sleeve and guantlet 2 weeks after sx. Instructed to wear during the day only, remove at night    Functional Mobility   Bed mobility: independent   Roll to left: independent   Roll to right: independent   Supine to prone: independent   Scooting to edge of bed: independent   Supine to sit: independent   Sit to supine: independent   Transfers to bed: independent   Transfers to toilet: independent   Sit to stand: independent   Stand pivot: independent   Car  transfers: independent     Gait Assessment  AD used: none  Assistance: independent  Distance: community distances  Endurance: WFL     Gait Pattern: independent, normal pattern      Treatment     Treatment Time In: 01:25 PM  Treatment Time Out: 01:55 PM  Total Treatment time separate from Evaluation: 30 minutes      Self-Care/Home Management to improve behavioral/activity modifications related to ADLs, compensatory training, safety procedures and adaptive equipment for 15 minutes including:  Garments: PT recommend wearing garments for one year per guidelines for prophylactical assist to decrease risk for lymphedema  Skin care  Weight management  Sleep  Nutrition  Infection prevention  Educated patient and daughter technique for donning compression arm sleeve.     Therapeutic Exercise to develop ROM, flexibility and posture for 15 minutes including:  Surgical protocol for position recommendations  Diaphragmatic Breathing  Exercises by day, complete with pictures of exercises and description for safe progression of motion      Education: Instructed on general anatomy/physiology, lymphedema information (definitions, signs, symptoms, precautions), role of therapy in multi-disciplinary team, purpose of lymphedema physical therapy and the benefits/risks of treatment, risks of refusing treatment, POC, and goals for therapy were discussed with the pt.    Written Home Exercises Provided: yes.  Exercises were reviewed and Agustina was able to demonstrate them prior to the end of the session. Agustina demonstrated good  understanding of the education provided.     See EMR under Patient Instructions for exercises provided 7/29/2022.    Assessment     Agustina is a 71 y.o. female referred to outpatient physical therapy with a medical diagnosis of invasive ductal carcinoma of left breast with surgical procedure planned on 08/02/2022. Pt was seen today pre-operatively to assess strength and ROM of BUEs, to take baseline circumferential  measurements of BUEs to aid in the early detection of lymphedema post-operatively, and to provide pt education on exercises/precautions post-operative. Pt does not exhibit any ROM impairments currently. This pt will benefit from skilled PT for reassessment of baseline measurements 6-8 week post-operatively and to address future impairments following surgery such as pain, limited ROM, or decreased mobility. Will need to wait until pt is medically cleared to determine if pt is safe for MLD, pneumatic compression pump, or lymphatouch treatments.      Plan of care discussed with patient: Yes  Pt's spiritual, cultural and educational needs considered and patient is agreeable to the plan of care and goals as stated below:     Anticipated barriers for therapy: none    Medical Necessity is demonstrated by the following:  History  Co-morbidities and personal factors that may impact the plan of care Co-morbidities:   advanced age, diabetes and history of cancer    Personal Factors:   none     low   Examination  Body Structures and Functions, activity limitations and participation restrictions that may impact the plan of care Body Systems:    no deficits    Activity limitations:   Mobility  no deficits    Self care  no deficits    Domestic Life  no deficits         low   Clinical Presentation stable and uncomplicated low   Decision Making/ Complexity Score: low       GOALS  Short Term Goals: 3 months  1. Pt to be seen for reassessment after radiation complete.  2. Pt will demonstrate 100% knowledge of lymphedema precautions and signs of infection.  3. Pt to obtain compression garments for prophylactic concerns according to APTA clinical guidelines published in Journal of Physical Therapy.    Long Term Goals: deferred    Plan     Plan of Care: 7/29/2022 to 10/29/2022.    Patient to be seen for PT follow up 1-2 visits for reassessment once she completes radiation. Patient will benefit from Outpatient Physical Therapy services  which may include the following interventions: patient education, HEP, therapeutic exercises, neuromuscular re-education, therapeutic activity, manual therapy, self care/home management, modalities, gait training, decongestive massage, multi-layered bandaging, self massage, self bandaging, and assistance in obtaining appropriate compression garment.      If pt is to undergo XRT, POC must exclude MLD, pneumatic compression pump, and lymphatouch to any radiated area.       Cathy Guerra, PT, CLT

## 2022-08-01 ENCOUNTER — HOSPITAL ENCOUNTER (OUTPATIENT)
Dept: RADIATION THERAPY | Facility: HOSPITAL | Age: 72
Discharge: HOME | End: 2022-08-01
Attending: RADIOLOGY
Payer: MEDICARE

## 2022-08-12 ENCOUNTER — TELEPHONE (OUTPATIENT)
Dept: HEMATOLOGY/ONCOLOGY | Facility: CLINIC | Age: 72
End: 2022-08-12
Payer: MEDICARE

## 2022-08-12 NOTE — NURSING
Called pt to set up f/u appt with Dr. Zhang.  Pt requested I call her daughter to set up the appt.

## 2022-08-24 ENCOUNTER — HOSPITAL ENCOUNTER (OUTPATIENT)
Dept: RADIATION THERAPY | Facility: HOSPITAL | Age: 72
Discharge: HOME | End: 2022-08-24
Attending: RADIOLOGY
Payer: MEDICARE

## 2022-08-24 PROCEDURE — 77014 PR  CT GUIDANCE PLACEMENT RAD THERAPY FIELDS: CPT | Mod: 26,,, | Performed by: RADIOLOGY

## 2022-08-24 PROCEDURE — 77334 RADIATION TREATMENT AID(S): CPT | Mod: 26,,, | Performed by: RADIOLOGY

## 2022-08-24 PROCEDURE — 77014 PR  CT GUIDANCE PLACEMENT RAD THERAPY FIELDS: ICD-10-PCS | Mod: 26,,, | Performed by: RADIOLOGY

## 2022-08-24 PROCEDURE — 77334 RADIATION TREATMENT AID(S): CPT | Mod: TC,PN | Performed by: RADIOLOGY

## 2022-08-24 PROCEDURE — 77014 HC CT GUIDANCE RADIATION THERAPY FLDS PLACEMENT: CPT | Mod: TC,PN | Performed by: RADIOLOGY

## 2022-08-24 PROCEDURE — 77334 PR  RADN TREATMENT AID(S) COMPLX: ICD-10-PCS | Mod: 26,,, | Performed by: RADIOLOGY

## 2022-08-29 ENCOUNTER — OFFICE VISIT (OUTPATIENT)
Dept: NEUROLOGY | Facility: CLINIC | Age: 72
End: 2022-08-29
Payer: MEDICARE

## 2022-08-29 DIAGNOSIS — R41.3 MEMORY LOSS: Primary | ICD-10-CM

## 2022-08-29 PROCEDURE — 96116 PR NEUROBEHAVIORAL STATUS EXAM BY PSYCH/PHYS: ICD-10-PCS | Mod: 95,FQ,, | Performed by: STUDENT IN AN ORGANIZED HEALTH CARE EDUCATION/TRAINING PROGRAM

## 2022-08-29 PROCEDURE — 4010F PR ACE/ARB THEARPY RXD/TAKEN: ICD-10-PCS | Mod: CPTII,95,, | Performed by: STUDENT IN AN ORGANIZED HEALTH CARE EDUCATION/TRAINING PROGRAM

## 2022-08-29 PROCEDURE — 96116 NUBHVL XM PHYS/QHP 1ST HR: CPT | Mod: 95,FQ,, | Performed by: STUDENT IN AN ORGANIZED HEALTH CARE EDUCATION/TRAINING PROGRAM

## 2022-08-29 PROCEDURE — 3061F NEG MICROALBUMINURIA REV: CPT | Mod: CPTII,95,, | Performed by: STUDENT IN AN ORGANIZED HEALTH CARE EDUCATION/TRAINING PROGRAM

## 2022-08-29 PROCEDURE — 99499 NO LOS: ICD-10-PCS | Mod: 95,,, | Performed by: STUDENT IN AN ORGANIZED HEALTH CARE EDUCATION/TRAINING PROGRAM

## 2022-08-29 PROCEDURE — 3066F PR DOCUMENTATION OF TREATMENT FOR NEPHROPATHY: ICD-10-PCS | Mod: CPTII,95,, | Performed by: STUDENT IN AN ORGANIZED HEALTH CARE EDUCATION/TRAINING PROGRAM

## 2022-08-29 PROCEDURE — 99499 UNLISTED E&M SERVICE: CPT | Mod: 95,,, | Performed by: STUDENT IN AN ORGANIZED HEALTH CARE EDUCATION/TRAINING PROGRAM

## 2022-08-29 PROCEDURE — 3051F HG A1C>EQUAL 7.0%<8.0%: CPT | Mod: CPTII,95,, | Performed by: STUDENT IN AN ORGANIZED HEALTH CARE EDUCATION/TRAINING PROGRAM

## 2022-08-29 PROCEDURE — 3051F PR MOST RECENT HEMOGLOBIN A1C LEVEL 7.0 - < 8.0%: ICD-10-PCS | Mod: CPTII,95,, | Performed by: STUDENT IN AN ORGANIZED HEALTH CARE EDUCATION/TRAINING PROGRAM

## 2022-08-29 PROCEDURE — 3061F PR NEG MICROALBUMINURIA RESULT DOCUMENTED/REVIEW: ICD-10-PCS | Mod: CPTII,95,, | Performed by: STUDENT IN AN ORGANIZED HEALTH CARE EDUCATION/TRAINING PROGRAM

## 2022-08-29 PROCEDURE — 4010F ACE/ARB THERAPY RXD/TAKEN: CPT | Mod: CPTII,95,, | Performed by: STUDENT IN AN ORGANIZED HEALTH CARE EDUCATION/TRAINING PROGRAM

## 2022-08-29 PROCEDURE — 3066F NEPHROPATHY DOC TX: CPT | Mod: CPTII,95,, | Performed by: STUDENT IN AN ORGANIZED HEALTH CARE EDUCATION/TRAINING PROGRAM
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
## 2022-08-30 ENCOUNTER — TELEPHONE (OUTPATIENT)
Dept: HEMATOLOGY/ONCOLOGY | Facility: CLINIC | Age: 72
End: 2022-08-30
Payer: MEDICARE

## 2022-08-30 ENCOUNTER — PATIENT MESSAGE (OUTPATIENT)
Dept: SURGERY | Facility: CLINIC | Age: 72
End: 2022-08-30
Payer: MEDICARE

## 2022-08-30 NOTE — TELEPHONE ENCOUNTER
LVM to see where she is at with rxt to schedule PT f/u.      ----- Message from Ruperto Deras MA sent at 8/2/2022 11:55 AM CDT -----  Regarding: Post rxt PT  Call daughter to see when rxt will start/end to schedule PT    Rxt Simulation on 8/24    ----- Message -----  From: Ruperto Deras MA  Sent: 7/29/2022   3:31 PM CDT  To: Ruperto Deras MA    [3:26 PM] Cathy Guerra  Mrs. Agustina Crow was a prehab today. the pt will be having radiation but doesn't have a start date yet. I asked her daughter to call to schedule the PT follow up for after they are completed radiation.    like 1

## 2022-09-01 NOTE — NURSING
Spoke to pt's daughter, Jennifer. Scheduled post op f/u appt for 9/7/22 (pt will be here for another appt). Reviewed contact information.  Encouraged her to call with any questions or concerns.

## 2022-09-06 ENCOUNTER — TELEPHONE (OUTPATIENT)
Dept: HEMATOLOGY/ONCOLOGY | Facility: CLINIC | Age: 72
End: 2022-09-06
Payer: MEDICARE

## 2022-09-06 ENCOUNTER — OFFICE VISIT (OUTPATIENT)
Dept: NEUROLOGY | Facility: CLINIC | Age: 72
End: 2022-09-06
Payer: MEDICARE

## 2022-09-06 DIAGNOSIS — R41.3 MEMORY LOSS: ICD-10-CM

## 2022-09-06 PROCEDURE — 4010F ACE/ARB THERAPY RXD/TAKEN: CPT | Mod: CPTII,S$GLB,, | Performed by: STUDENT IN AN ORGANIZED HEALTH CARE EDUCATION/TRAINING PROGRAM

## 2022-09-06 PROCEDURE — 96132 PR NEUROPSYCHOLOGIC TEST EVAL SVCS, 1ST HR: ICD-10-PCS | Mod: S$GLB,,, | Performed by: STUDENT IN AN ORGANIZED HEALTH CARE EDUCATION/TRAINING PROGRAM

## 2022-09-06 PROCEDURE — 3051F PR MOST RECENT HEMOGLOBIN A1C LEVEL 7.0 - < 8.0%: ICD-10-PCS | Mod: CPTII,S$GLB,, | Performed by: STUDENT IN AN ORGANIZED HEALTH CARE EDUCATION/TRAINING PROGRAM

## 2022-09-06 PROCEDURE — 96139 PR PSYCH/NEUROPSYCH TEST ADMIN/SCORING, BY TECH, 2+ TESTS, EA ADDTL 30 MIN: ICD-10-PCS | Mod: S$GLB,,, | Performed by: STUDENT IN AN ORGANIZED HEALTH CARE EDUCATION/TRAINING PROGRAM

## 2022-09-06 PROCEDURE — 3061F NEG MICROALBUMINURIA REV: CPT | Mod: CPTII,S$GLB,, | Performed by: STUDENT IN AN ORGANIZED HEALTH CARE EDUCATION/TRAINING PROGRAM

## 2022-09-06 PROCEDURE — 99499 UNLISTED E&M SERVICE: CPT | Mod: S$GLB,,, | Performed by: STUDENT IN AN ORGANIZED HEALTH CARE EDUCATION/TRAINING PROGRAM

## 2022-09-06 PROCEDURE — 4010F PR ACE/ARB THEARPY RXD/TAKEN: ICD-10-PCS | Mod: CPTII,S$GLB,, | Performed by: STUDENT IN AN ORGANIZED HEALTH CARE EDUCATION/TRAINING PROGRAM

## 2022-09-06 PROCEDURE — 96138 PR PSYCH/NEUROPSYCH TEST ADMIN/SCORING, BY TECH, 2+ TESTS, 1ST 30 MIN: ICD-10-PCS | Mod: S$GLB,,, | Performed by: STUDENT IN AN ORGANIZED HEALTH CARE EDUCATION/TRAINING PROGRAM

## 2022-09-06 PROCEDURE — 96132 NRPSYC TST EVAL PHYS/QHP 1ST: CPT | Mod: S$GLB,,, | Performed by: STUDENT IN AN ORGANIZED HEALTH CARE EDUCATION/TRAINING PROGRAM

## 2022-09-06 PROCEDURE — 96138 PSYCL/NRPSYC TECH 1ST: CPT | Mod: S$GLB,,, | Performed by: STUDENT IN AN ORGANIZED HEALTH CARE EDUCATION/TRAINING PROGRAM

## 2022-09-06 PROCEDURE — 96133 NRPSYC TST EVAL PHYS/QHP EA: CPT | Mod: S$GLB,,, | Performed by: STUDENT IN AN ORGANIZED HEALTH CARE EDUCATION/TRAINING PROGRAM

## 2022-09-06 PROCEDURE — 3061F PR NEG MICROALBUMINURIA RESULT DOCUMENTED/REVIEW: ICD-10-PCS | Mod: CPTII,S$GLB,, | Performed by: STUDENT IN AN ORGANIZED HEALTH CARE EDUCATION/TRAINING PROGRAM

## 2022-09-06 PROCEDURE — 99999 PR PBB SHADOW E&M-EST. PATIENT-LVL I: ICD-10-PCS | Mod: PBBFAC,,, | Performed by: STUDENT IN AN ORGANIZED HEALTH CARE EDUCATION/TRAINING PROGRAM

## 2022-09-06 PROCEDURE — 96139 PSYCL/NRPSYC TST TECH EA: CPT | Mod: S$GLB,,, | Performed by: STUDENT IN AN ORGANIZED HEALTH CARE EDUCATION/TRAINING PROGRAM

## 2022-09-06 PROCEDURE — 96133 PR NEUROPSYCHOLOGIC TEST EVAL SVCS, EA ADDTL HR: ICD-10-PCS | Mod: S$GLB,,, | Performed by: STUDENT IN AN ORGANIZED HEALTH CARE EDUCATION/TRAINING PROGRAM

## 2022-09-06 PROCEDURE — 99999 PR PBB SHADOW E&M-EST. PATIENT-LVL I: CPT | Mod: PBBFAC,,, | Performed by: STUDENT IN AN ORGANIZED HEALTH CARE EDUCATION/TRAINING PROGRAM

## 2022-09-06 PROCEDURE — 3066F PR DOCUMENTATION OF TREATMENT FOR NEPHROPATHY: ICD-10-PCS | Mod: CPTII,S$GLB,, | Performed by: STUDENT IN AN ORGANIZED HEALTH CARE EDUCATION/TRAINING PROGRAM

## 2022-09-06 PROCEDURE — 99499 NO LOS: ICD-10-PCS | Mod: S$GLB,,, | Performed by: STUDENT IN AN ORGANIZED HEALTH CARE EDUCATION/TRAINING PROGRAM

## 2022-09-06 PROCEDURE — 3051F HG A1C>EQUAL 7.0%<8.0%: CPT | Mod: CPTII,S$GLB,, | Performed by: STUDENT IN AN ORGANIZED HEALTH CARE EDUCATION/TRAINING PROGRAM

## 2022-09-06 PROCEDURE — 3066F NEPHROPATHY DOC TX: CPT | Mod: CPTII,S$GLB,, | Performed by: STUDENT IN AN ORGANIZED HEALTH CARE EDUCATION/TRAINING PROGRAM

## 2022-09-06 NOTE — TELEPHONE ENCOUNTER
LVM to schedule PT post rxt      ----- Message from Ruperto Deras MA sent at 8/2/2022 11:55 AM CDT -----  Regarding: Post rxt PT  Call daughter to see when rxt will start/end to schedule PT    Rxt Simulation on 8/24    ----- Message -----  From: Ruperto Deras MA  Sent: 7/29/2022   3:31 PM CDT  To: Ruperto Deras MA    [3:26 PM] Cathy Guerra  Mrs. Agustina Crow was a prehab today. the pt will be having radiation but doesn't have a start date yet. I asked her daughter to call to schedule the PT follow up for after they are completed radiation.    like 1

## 2022-09-07 ENCOUNTER — OFFICE VISIT (OUTPATIENT)
Dept: HEMATOLOGY/ONCOLOGY | Facility: CLINIC | Age: 72
End: 2022-09-07
Payer: MEDICARE

## 2022-09-07 VITALS
TEMPERATURE: 97 F | RESPIRATION RATE: 18 BRPM | DIASTOLIC BLOOD PRESSURE: 66 MMHG | HEIGHT: 64 IN | HEART RATE: 60 BPM | SYSTOLIC BLOOD PRESSURE: 148 MMHG | BODY MASS INDEX: 21.91 KG/M2 | WEIGHT: 128.31 LBS | OXYGEN SATURATION: 99 %

## 2022-09-07 DIAGNOSIS — C50.912 INVASIVE DUCTAL CARCINOMA OF BREAST, FEMALE, LEFT: Primary | ICD-10-CM

## 2022-09-07 DIAGNOSIS — N18.31 CHRONIC KIDNEY DISEASE, STAGE 3A: ICD-10-CM

## 2022-09-07 DIAGNOSIS — Z79.811 USE OF ANASTROZOLE: ICD-10-CM

## 2022-09-07 DIAGNOSIS — E11.65 UNCONTROLLED TYPE 2 DIABETES MELLITUS WITH HYPERGLYCEMIA: ICD-10-CM

## 2022-09-07 PROCEDURE — 3051F HG A1C>EQUAL 7.0%<8.0%: CPT | Mod: CPTII,S$GLB,, | Performed by: STUDENT IN AN ORGANIZED HEALTH CARE EDUCATION/TRAINING PROGRAM

## 2022-09-07 PROCEDURE — 3008F PR BODY MASS INDEX (BMI) DOCUMENTED: ICD-10-PCS | Mod: CPTII,S$GLB,, | Performed by: STUDENT IN AN ORGANIZED HEALTH CARE EDUCATION/TRAINING PROGRAM

## 2022-09-07 PROCEDURE — 1160F PR REVIEW ALL MEDS BY PRESCRIBER/CLIN PHARMACIST DOCUMENTED: ICD-10-PCS | Mod: CPTII,S$GLB,, | Performed by: STUDENT IN AN ORGANIZED HEALTH CARE EDUCATION/TRAINING PROGRAM

## 2022-09-07 PROCEDURE — 1160F RVW MEDS BY RX/DR IN RCRD: CPT | Mod: CPTII,S$GLB,, | Performed by: STUDENT IN AN ORGANIZED HEALTH CARE EDUCATION/TRAINING PROGRAM

## 2022-09-07 PROCEDURE — 3078F DIAST BP <80 MM HG: CPT | Mod: CPTII,S$GLB,, | Performed by: STUDENT IN AN ORGANIZED HEALTH CARE EDUCATION/TRAINING PROGRAM

## 2022-09-07 PROCEDURE — 3066F PR DOCUMENTATION OF TREATMENT FOR NEPHROPATHY: ICD-10-PCS | Mod: CPTII,S$GLB,, | Performed by: STUDENT IN AN ORGANIZED HEALTH CARE EDUCATION/TRAINING PROGRAM

## 2022-09-07 PROCEDURE — 99999 PR PBB SHADOW E&M-EST. PATIENT-LVL IV: ICD-10-PCS | Mod: PBBFAC,,, | Performed by: STUDENT IN AN ORGANIZED HEALTH CARE EDUCATION/TRAINING PROGRAM

## 2022-09-07 PROCEDURE — 99999 PR PBB SHADOW E&M-EST. PATIENT-LVL IV: CPT | Mod: PBBFAC,,, | Performed by: STUDENT IN AN ORGANIZED HEALTH CARE EDUCATION/TRAINING PROGRAM

## 2022-09-07 PROCEDURE — 4010F ACE/ARB THERAPY RXD/TAKEN: CPT | Mod: CPTII,S$GLB,, | Performed by: STUDENT IN AN ORGANIZED HEALTH CARE EDUCATION/TRAINING PROGRAM

## 2022-09-07 PROCEDURE — 1101F PT FALLS ASSESS-DOCD LE1/YR: CPT | Mod: CPTII,S$GLB,, | Performed by: STUDENT IN AN ORGANIZED HEALTH CARE EDUCATION/TRAINING PROGRAM

## 2022-09-07 PROCEDURE — 99215 PR OFFICE/OUTPT VISIT, EST, LEVL V, 40-54 MIN: ICD-10-PCS | Mod: S$GLB,,, | Performed by: STUDENT IN AN ORGANIZED HEALTH CARE EDUCATION/TRAINING PROGRAM

## 2022-09-07 PROCEDURE — 3061F NEG MICROALBUMINURIA REV: CPT | Mod: CPTII,S$GLB,, | Performed by: STUDENT IN AN ORGANIZED HEALTH CARE EDUCATION/TRAINING PROGRAM

## 2022-09-07 PROCEDURE — 3288F PR FALLS RISK ASSESSMENT DOCUMENTED: ICD-10-PCS | Mod: CPTII,S$GLB,, | Performed by: STUDENT IN AN ORGANIZED HEALTH CARE EDUCATION/TRAINING PROGRAM

## 2022-09-07 PROCEDURE — 3077F SYST BP >= 140 MM HG: CPT | Mod: CPTII,S$GLB,, | Performed by: STUDENT IN AN ORGANIZED HEALTH CARE EDUCATION/TRAINING PROGRAM

## 2022-09-07 PROCEDURE — 3077F PR MOST RECENT SYSTOLIC BLOOD PRESSURE >= 140 MM HG: ICD-10-PCS | Mod: CPTII,S$GLB,, | Performed by: STUDENT IN AN ORGANIZED HEALTH CARE EDUCATION/TRAINING PROGRAM

## 2022-09-07 PROCEDURE — 1159F MED LIST DOCD IN RCRD: CPT | Mod: CPTII,S$GLB,, | Performed by: STUDENT IN AN ORGANIZED HEALTH CARE EDUCATION/TRAINING PROGRAM

## 2022-09-07 PROCEDURE — 3061F PR NEG MICROALBUMINURIA RESULT DOCUMENTED/REVIEW: ICD-10-PCS | Mod: CPTII,S$GLB,, | Performed by: STUDENT IN AN ORGANIZED HEALTH CARE EDUCATION/TRAINING PROGRAM

## 2022-09-07 PROCEDURE — 3078F PR MOST RECENT DIASTOLIC BLOOD PRESSURE < 80 MM HG: ICD-10-PCS | Mod: CPTII,S$GLB,, | Performed by: STUDENT IN AN ORGANIZED HEALTH CARE EDUCATION/TRAINING PROGRAM

## 2022-09-07 PROCEDURE — 3066F NEPHROPATHY DOC TX: CPT | Mod: CPTII,S$GLB,, | Performed by: STUDENT IN AN ORGANIZED HEALTH CARE EDUCATION/TRAINING PROGRAM

## 2022-09-07 PROCEDURE — 1159F PR MEDICATION LIST DOCUMENTED IN MEDICAL RECORD: ICD-10-PCS | Mod: CPTII,S$GLB,, | Performed by: STUDENT IN AN ORGANIZED HEALTH CARE EDUCATION/TRAINING PROGRAM

## 2022-09-07 PROCEDURE — 1126F PR PAIN SEVERITY QUANTIFIED, NO PAIN PRESENT: ICD-10-PCS | Mod: CPTII,S$GLB,, | Performed by: STUDENT IN AN ORGANIZED HEALTH CARE EDUCATION/TRAINING PROGRAM

## 2022-09-07 PROCEDURE — 99215 OFFICE O/P EST HI 40 MIN: CPT | Mod: S$GLB,,, | Performed by: STUDENT IN AN ORGANIZED HEALTH CARE EDUCATION/TRAINING PROGRAM

## 2022-09-07 PROCEDURE — 1126F AMNT PAIN NOTED NONE PRSNT: CPT | Mod: CPTII,S$GLB,, | Performed by: STUDENT IN AN ORGANIZED HEALTH CARE EDUCATION/TRAINING PROGRAM

## 2022-09-07 PROCEDURE — 3008F BODY MASS INDEX DOCD: CPT | Mod: CPTII,S$GLB,, | Performed by: STUDENT IN AN ORGANIZED HEALTH CARE EDUCATION/TRAINING PROGRAM

## 2022-09-07 PROCEDURE — 3288F FALL RISK ASSESSMENT DOCD: CPT | Mod: CPTII,S$GLB,, | Performed by: STUDENT IN AN ORGANIZED HEALTH CARE EDUCATION/TRAINING PROGRAM

## 2022-09-07 PROCEDURE — 1101F PR PT FALLS ASSESS DOC 0-1 FALLS W/OUT INJ PAST YR: ICD-10-PCS | Mod: CPTII,S$GLB,, | Performed by: STUDENT IN AN ORGANIZED HEALTH CARE EDUCATION/TRAINING PROGRAM

## 2022-09-07 PROCEDURE — 3051F PR MOST RECENT HEMOGLOBIN A1C LEVEL 7.0 - < 8.0%: ICD-10-PCS | Mod: CPTII,S$GLB,, | Performed by: STUDENT IN AN ORGANIZED HEALTH CARE EDUCATION/TRAINING PROGRAM

## 2022-09-07 PROCEDURE — 4010F PR ACE/ARB THEARPY RXD/TAKEN: ICD-10-PCS | Mod: CPTII,S$GLB,, | Performed by: STUDENT IN AN ORGANIZED HEALTH CARE EDUCATION/TRAINING PROGRAM

## 2022-09-07 RX ORDER — SERTRALINE HYDROCHLORIDE 25 MG/1
25 TABLET, FILM COATED ORAL DAILY
COMMUNITY
Start: 2022-07-24 | End: 2022-10-24

## 2022-09-07 NOTE — PROGRESS NOTES
Subjective:                                                                                      Patient ID:    Name: Agustina Crow  : 1950  MRN: [id number]    HPI:   Agustina Crow is a 72 y.o. female presents for evaluation of Invasive ductal carcinoma of breast, female, left      Today, patient is post op, doing well, having significant swelling/edema around the surgical site, possible discussion about seroma aspiration and after she will get mapping and start adjuvant XRT, otherwise patient is doing well with minimal pain. Here with her daughter to discuss about the results of the oncotype     Oncology history:     Patient was initially seen as part of a Multi-D clinic with Radiation Oncology,Surgical Oncology and Medical Oncology. Case was also presented at breast cancer tumor conference.     [place] a mass,22 62 mammogram/ Ultrasound ; Left 1200 2.9cm mass and another 1.2cm mass     22 US biopsy of both left lesions UIQ 1100 and LIQ; Left 1100 UIQ 4cm FN ;Grade 1 IDC ER 98% HI 9% Her 2 2+ FISH neg Ki 67 14%, Left 0800 LIQ 2cm FN hemangioma    MRI; left breast; 23 x 27 x 26 mm, corresponding to the biopsy proven malignancy and 11 x 7 x 13 mm, corresponding to the hemangioma.      22; Lumpectomy with SNL; pT2,pN0(sn)cM0 --> Stage IA     Oncology History   Invasive ductal carcinoma of breast, female, left   2022 Initial Diagnosis    Invasive ductal carcinoma of left breast in female     2022 Cancer Staged    Staging form: Breast, AJCC 8th Edition  - Clinical stage from 2022: Stage IB (cT2, cN0, cM0, G1, ER+, HI+, HER2-)       2022 Cancer Staged    Staging form: Breast, AJCC 8th Edition  - Pathologic stage from 2022: Stage IA (pT2, pN0(sn), cM0, G1, ER+, HI+, HER2-)              Past Medical History:   Diagnosis Date    Diabetes mellitus     Hypertension        Past Surgical History:   Procedure Laterality Date    SENTINEL LYMPH NODE BIOPSY Left 2022    Procedure:  "BIOPSY, LYMPH NODE, SENTINEL;  Surgeon: Monie Krueger MD;  Location: [facility] OR;  Service: General;  Laterality: Left;    SPINAL FUSION         Family History   Problem Relation Age of Onset    Cancer Maternal Aunt         breast       Social History     Socioeconomic History    Marital status:    Tobacco Use    Smoking status: Never    Smokeless tobacco: Never   Substance and Sexual Activity    Alcohol use: Never    Drug use: Never       Review of patient's allergies indicates:   Allergen Reactions    Pcn [penicillins] Anaphylaxis       Review of Systems   Constitutional: Negative for decreased appetite, fever and night sweats.   Eyes:  Negative for blurred vision, double vision, pain and visual disturbance.   Cardiovascular:  Negative for chest pain.   Respiratory:  Negative for cough and shortness of breath.    Hematologic/Lymphatic: Negative for adenopathy.   Skin:  Negative for rash.   Musculoskeletal:  Negative for back pain.   Gastrointestinal:  Negative for abdominal pain and diarrhea.   Genitourinary:  Negative for frequency.   Neurological:  Negative for headaches.   Psychiatric/Behavioral:  The patient is not nervous/anxious.           Objective:     Vitals:    09/07/22 1002   BP: (!) 148/66   BP Location: Right arm   Patient Position: Sitting   BP Method: Medium (Automatic)   Pulse: 60   Resp: 18   Temp: 96.8 °F (36 °C)   TempSrc: Temporal   SpO2: 99%   Weight: 58.2 kg (128 lb 4.9 oz)   Height: 5' 4" (1.626 m)        Physical Exam  Constitutional:       General: She is not in acute distress.     Appearance: Normal appearance. She is normal weight.   HENT:      Head: Normocephalic and atraumatic.   Eyes:      General: No scleral icterus.  Cardiovascular:      Rate and Rhythm: Normal rate and regular rhythm.      Heart sounds: Normal heart sounds.   Pulmonary:      Effort: Pulmonary effort is normal.      Breath sounds: Normal breath sounds. No wheezing.   Chest:      Chest wall: No tenderness. "      Comments: Left side lumpectomy site with erythema and slightly seroma, no lymphadenopathy appreciated it bilateral   Abdominal:      General: Bowel sounds are normal. There is no distension.      Palpations: Abdomen is soft. There is no mass.   Musculoskeletal:         General: No swelling or tenderness.      Cervical back: Neck supple.   Lymphadenopathy:      Cervical: No cervical adenopathy.   Skin:     Coloration: Skin is not jaundiced or pale.   Neurological:      General: No focal deficit present.      Mental Status: She is oriented to person, place, and time.   Psychiatric:         Mood and Affect: Mood normal.         Behavior: Behavior normal.     Current Outpatient Medications on File Prior to Visit   Medication Sig    blood sugar diagnostic Strp 1 strip by Misc.(Non-Drug; Combo Route) route once daily.    cyanocobalamin (VITAMIN B-12) 100 MCG tablet Take 100 mcg by mouth once daily.    lancets Misc 1 lancet by Misc.(Non-Drug; Combo Route) route once daily.    lisinopriL (PRINIVIL,ZESTRIL) 2.5 MG tablet Take 1 tablet (2.5 mg total) by mouth once daily.    metFORMIN (GLUCOPHAGE) 500 MG tablet Take 1 tablet (500 mg total) by mouth 2 (two) times daily with meals.    sertraline (ZOLOFT) 50 MG tablet Take 0.5 tablets (25 mg total) by mouth once daily. (Patient taking differently: Take 50 mg by mouth once daily.)    aspirin (ECOTRIN) 81 MG EC tablet Take 1 tablet (81 mg total) by mouth once daily.    blood-glucose meter Misc 1 Device by Misc.(Non-Drug; Combo Route) route once daily. for 1 day    fenofibrate 160 MG Tab Take 1 tablet (160 mg total) by mouth once daily.    sertraline (ZOLOFT) 25 MG tablet Take 25 mg by mouth once daily.    traMADoL (ULTRAM) 50 mg tablet Take 1-2 tablets ( mg total) by mouth every 6 (six) hours. (Patient not taking: Reported on 9/7/2022)     Current Facility-Administered Medications on File Prior to Visit   Medication    lactated ringers infusion     CBC:  Lab Results    Component Value Date    WBC 6.06 08/02/2022    HGB 13.0 08/02/2022    HCT 40.4 08/02/2022    MCV 90 08/02/2022     08/02/2022     CMP:  Sodium   Date Value Ref Range Status   08/02/2022 140 136 - 145 mmol/L Final     Potassium   Date Value Ref Range Status   08/02/2022 4.4 3.5 - 5.1 mmol/L Final     Chloride   Date Value Ref Range Status   08/02/2022 109 95 - 110 mmol/L Final     CO2   Date Value Ref Range Status   08/02/2022 22 22 - 31 mmol/L Final     Glucose   Date Value Ref Range Status   08/02/2022 166 (H) 70 - 110 mg/dL Final     Comment:     The ADA recommends the following guidelines for fasting glucose:    Normal:       less than 100 mg/dL    Prediabetes:  100 mg/dL to 125 mg/dL    Diabetes:     126 mg/dL or higher       BUN   Date Value Ref Range Status   08/02/2022 25 (H) 7 - 18 mg/dL Final     Creatinine   Date Value Ref Range Status   08/02/2022 1.07 0.50 - 1.40 mg/dL Final     Calcium   Date Value Ref Range Status   08/02/2022 9.4 8.4 - 10.2 mg/dL Final     Total Protein   Date Value Ref Range Status   02/08/2022 6.8 6.0 - 8.4 g/dL Final     Albumin   Date Value Ref Range Status   02/08/2022 4.4 3.5 - 5.2 g/dL Final     Total Bilirubin   Date Value Ref Range Status   02/08/2022 0.4 0.2 - 1.3 mg/dL Final     Alkaline Phosphatase   Date Value Ref Range Status   02/08/2022 44 38 - 145 U/L Final     AST   Date Value Ref Range Status   02/08/2022 26 14 - 36 U/L Final     ALT   Date Value Ref Range Status   02/08/2022 16 0 - 35 U/L Final     Anion Gap   Date Value Ref Range Status   08/02/2022 9 8 - 16 mmol/L Final     eGFR if    Date Value Ref Range Status   02/08/2022 >60 >60 mL/min/1.73 m^2 Final     eGFR if non    Date Value Ref Range Status   02/08/2022 53 (A) >60 mL/min/1.73 m^2 Final     Comment:     Calculation used to obtain the estimated glomerular filtration  rate (eGFR) is the CKD-EPI equation.          US Guided Breast Seroma Aspiration  Result:   US  Guided Breast Seroma Aspiration    A seroma aspiration was performed with sonographic guidance on the left   breast at  2 o'clock, 4 cm from the nipple, using a 18 gauge needle and   approximately 50 ml of clear reddish fluid was removed. Lesion did   completely resolve. Fluid not sent for cytologic evaluation.  US Breast Left Complete  Narrative: Result:   US Breast Left Complete     History:  Patient is 72 y.o. and is seen for possible seroma. Status post left   lumpectomy 8/2/22.    Pathology:  1.  LEFT AXILLARY SENTINEL LYMPH NODES, EXCISION:   - TWO LYMPH NODES, BOTH NEGATIVE FOR TUMOR (0/2).     2.  LEFT AXILLARY TISSUE, EXCISION:   - ONE LYMPH NODE, NO TUMOR SEEN (0/1).     3. SKIN AND SUBCUTANEOUS TISSUE LEFT BREAST, RE-EXCISION ANTERIOR SUPERIOR   MARGIN:   - NO TUMOR SEEN.     4. SKIN AND SUBCUTANEOUS TISSUE LEFT BREAST, RE-EXCISION ANTERIOR INFERIOR   MARGIN:   - NO TUMOR SEEN.     5. SKIN AND SUBCUTANEOUS TISSUE LEFT BREAST, RE-EXCISION ANTERIOR SUPERIOR   MARGIN #2:   - NO TUMOR SEEN.     6. LEFT UPPER INNER QUADRANT BREAST, WIDE EXCISION CORE NEEDLE BIOPSY   SITE:   - INVASIVE DUCT CARCINOMA, 3.5 CM., LOW GRADE (1,2,2).   - MARGINS NEGATIVE FOR TUMOR.   - SKELETAL MUSCLE NEGATIVE FOR TUMOR.     7.  LEFT BREAST, RE-EXCISION DEEP MARGIN:   - SKELETAL MUSCLE NEGATIVE FOR TUMOR.     Films Compared:  Compared to: 07/06/2022 US Breast Biopsy with Imaging 1st site Left,   07/06/2022 Mammo Digital Diagnostic Left, 06/28/2022 US Breast Left   Limited, and 06/22/2022 Mammo Digital Screening Bilat w/ Tao     Findings:    All four quadrants, the retroareolar, and the axillary regions were   scanned.    In the left superior breast, there is an anechoic fluid collection at the   left 11:00-2:00, 4 cm from the nipple, measuring 74 x 14 x 64 mm.   Aspiration recommended.    At the left 8:00, 2 cm from the nipple, there is a biopsy proven   hemangioma measuring 11 x 5 x 8 mm.     No sonographic suspicious lesions are  seen. No abnormal lymph nodes are   seen in the axilla.   Impression: In the left superior breast, there is an anechoic seroma at the left   11:00-2:00, 4 cm from the nipple, measuring 74 x 14 x 64 mm. Aspiration   recommended.    BI-RADS Category:   Overall: 2 - Benign     Recommendation:  Aspiration is recommended for the left breast.    I discussed the finding and recommendation in detail with Agustina Crow   at the time of the study.        ECOG SCORE    1 - Restricted in strenuous activity-ambulatory and able to carry out work of a light nature          All pertinent labs and imaging reviewed. As well as outside records     Assessment:       1. Invasive ductal carcinoma of breast, female, left    2. Use of anastrozole    3. Chronic kidney disease, stage 3a    4. Uncontrolled type 2 diabetes mellitus with hyperglycemia        Stage IB IDC, ER/KS+,HER-2 negative    We discussed in details disease, staging, prognosis, as well as surgical options and treatment options after/before surgery, no indication for neoadjuvant chemo.     8/2/22; Lumpectomy with SNL; pT2,pN0(sn)cM0 --> Stage IA      Patient is candidate for adjuvant endocrine therapy (>60) , post menopausal, will benefit from AI for 5 years., Oncotype was 25; <1% chemotherapy benefit and 12% distant recurrence risk at 9 years with AI, less likely patient will benefit from chemotherapy especially given her other co-morbidities, will start her on anastrozole after finishing adjuvant XRT.     - >60 post menopausal, no need for Estradiol/FSH.   - Discussed with patient side effect : hot flashes,arthralgia, mood disturbances, osteoporosis, insomnia, peripheral edema  - Discussed 5 vs 10 years, will likely benefit from 5 years adjuvant endocrine therapy  - will start on anastrazole daily, if unable to tolerate we will be able to move to other AI options (letrozole,exemestane) or tamoxifine   - will need DEXA scan     Hyperlipidemia:  Will need to monitor  annually while on AI     DM  Stable, controlled, following up with PCP     Plan:     Invasive ductal carcinoma of breast, female, left  -     BONE MIN DENSITY; Future; Expected date: 09/07/2022    Use of anastrozole  -     BONE MIN DENSITY; Future; Expected date: 09/07/2022    Chronic kidney disease, stage 3a    Uncontrolled type 2 diabetes mellitus with hyperglycemia         Patient queried and all questions were answered.    Plan was discussed with the patient and her family at length, and they verbalized understanding.      Recommend COVID guidelines being followed   Recommend  exercise, nutrition and weight management and health maintenance activities and follow-up with PCPs recommendations     Signed:  Aleena Zhang MD  Hematology and Oncology  [facility]  [facility]    Route Chart for Scheduling    Med Onc Chart Routing      Follow up with physician 6 weeks. with MD and DEXA scan prior   Follow up with ANABELL    Infusion scheduling note    Injection scheduling note    Labs    Imaging DXA scan   prior to next anabell   Pharmacy appointment    Other referrals
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
## 2022-09-09 ENCOUNTER — HOSPITAL ENCOUNTER (OUTPATIENT)
Dept: RADIATION THERAPY | Facility: HOSPITAL | Age: 72
Discharge: HOME | End: 2022-09-09
Attending: RADIOLOGY
Payer: MEDICARE

## 2022-09-09 PROCEDURE — 77014 HC CT GUIDANCE RADIATION THERAPY FLDS PLACEMENT: CPT | Mod: TC,PN | Performed by: RADIOLOGY

## 2022-09-09 PROCEDURE — 77290 PR  SET RADN THERAPY FIELD COMPLEX: ICD-10-PCS | Mod: 26,,, | Performed by: RADIOLOGY

## 2022-09-09 PROCEDURE — 77263 PR  RADIATION THERAPY PLAN COMPLEX: ICD-10-PCS | Mod: ,,, | Performed by: RADIOLOGY

## 2022-09-09 PROCEDURE — 77290 THER RAD SIMULAJ FIELD CPLX: CPT | Mod: TC,PN | Performed by: RADIOLOGY

## 2022-09-09 PROCEDURE — 77290 THER RAD SIMULAJ FIELD CPLX: CPT | Mod: 26,,, | Performed by: RADIOLOGY

## 2022-09-09 PROCEDURE — 77263 THER RADIOLOGY TX PLNG CPLX: CPT | Mod: ,,, | Performed by: RADIOLOGY

## 2022-09-12 ENCOUNTER — TELEPHONE (OUTPATIENT)
Dept: HEMATOLOGY/ONCOLOGY | Facility: CLINIC | Age: 72
End: 2022-09-12
Payer: MEDICARE

## 2022-09-12 NOTE — TELEPHONE ENCOUNTER
I spoke with Agustina and she said her daughter handles all of her appts and will ask her daughter to give me a call to schedule Pt post rxt.

## 2022-09-21 PROCEDURE — 77295 PR 3D RADIOTHERAPY PLAN: ICD-10-PCS | Mod: 26,,, | Performed by: RADIOLOGY

## 2022-09-21 PROCEDURE — 77293 RESPIRATOR MOTION MGMT SIMUL: CPT | Mod: TC,PN | Performed by: RADIOLOGY

## 2022-09-21 PROCEDURE — 77295 3-D RADIOTHERAPY PLAN: CPT | Mod: 26,,, | Performed by: RADIOLOGY

## 2022-09-21 PROCEDURE — 77334 PR  RADN TREATMENT AID(S) COMPLX: ICD-10-PCS | Mod: 26,,, | Performed by: RADIOLOGY

## 2022-09-21 PROCEDURE — 77293 PR RESPIRATORY MOTION MGMT SIMULATION: ICD-10-PCS | Mod: 26,,, | Performed by: RADIOLOGY

## 2022-09-21 PROCEDURE — 77295 3-D RADIOTHERAPY PLAN: CPT | Mod: TC,PN | Performed by: RADIOLOGY

## 2022-09-21 PROCEDURE — 77334 RADIATION TREATMENT AID(S): CPT | Mod: 26,,, | Performed by: RADIOLOGY

## 2022-09-21 PROCEDURE — 77300 RADIATION THERAPY DOSE PLAN: CPT | Mod: 26,,, | Performed by: RADIOLOGY

## 2022-09-21 PROCEDURE — 77334 RADIATION TREATMENT AID(S): CPT | Mod: TC,PN | Performed by: RADIOLOGY

## 2022-09-21 PROCEDURE — 77300 PR RADIATION THERAPY,DOSIMETRY PLAN: ICD-10-PCS | Mod: 26,,, | Performed by: RADIOLOGY

## 2022-09-21 PROCEDURE — 77300 RADIATION THERAPY DOSE PLAN: CPT | Mod: TC,PN | Performed by: RADIOLOGY

## 2022-09-21 PROCEDURE — 77293 RESPIRATOR MOTION MGMT SIMUL: CPT | Mod: 26,,, | Performed by: RADIOLOGY

## 2022-09-22 ENCOUNTER — DOCUMENTATION ONLY (OUTPATIENT)
Dept: RADIATION ONCOLOGY | Facility: CLINIC | Age: 72
End: 2022-09-22
Payer: MEDICARE

## 2022-09-22 PROCEDURE — 77417 THER RADIOLOGY PORT IMAGE(S): CPT | Mod: PN | Performed by: RADIOLOGY

## 2022-09-22 PROCEDURE — G6002 STEREOSCOPIC X-RAY GUIDANCE: HCPCS | Mod: 26,,, | Performed by: RADIOLOGY

## 2022-09-22 PROCEDURE — 77412 RADIATION TX DELIVERY LVL 3: CPT | Mod: PN | Performed by: RADIOLOGY

## 2022-09-22 PROCEDURE — G6002 PR STEREOSCOPIC XRAY GUIDE FOR RADIATION TX DELIV: ICD-10-PCS | Mod: 26,,, | Performed by: RADIOLOGY

## 2022-09-22 PROCEDURE — 77387 GUIDANCE FOR RADJ TX DLVR: CPT | Mod: TC,PN | Performed by: RADIOLOGY

## 2022-09-22 NOTE — PLAN OF CARE
Completed 1 of 16 outpatient radiation treatments without difficulty.  Education provided and patient verbalizes understanding.

## 2022-09-23 PROCEDURE — G6002 STEREOSCOPIC X-RAY GUIDANCE: HCPCS | Mod: 26,,, | Performed by: RADIOLOGY

## 2022-09-23 PROCEDURE — 77412 RADIATION TX DELIVERY LVL 3: CPT | Mod: PN | Performed by: RADIOLOGY

## 2022-09-23 PROCEDURE — 77387 GUIDANCE FOR RADJ TX DLVR: CPT | Mod: TC,PN | Performed by: RADIOLOGY

## 2022-09-23 PROCEDURE — G6002 PR STEREOSCOPIC XRAY GUIDE FOR RADIATION TX DELIV: ICD-10-PCS | Mod: 26,,, | Performed by: RADIOLOGY

## 2022-09-26 PROCEDURE — G6002 PR STEREOSCOPIC XRAY GUIDE FOR RADIATION TX DELIV: ICD-10-PCS | Mod: 26,,, | Performed by: RADIOLOGY

## 2022-09-26 PROCEDURE — 77387 GUIDANCE FOR RADJ TX DLVR: CPT | Mod: TC,PN | Performed by: RADIOLOGY

## 2022-09-26 PROCEDURE — G6002 STEREOSCOPIC X-RAY GUIDANCE: HCPCS | Mod: 26,,, | Performed by: RADIOLOGY

## 2022-09-26 PROCEDURE — 77412 RADIATION TX DELIVERY LVL 3: CPT | Mod: PN | Performed by: RADIOLOGY

## 2022-09-27 ENCOUNTER — DOCUMENTATION ONLY (OUTPATIENT)
Dept: RADIATION ONCOLOGY | Facility: CLINIC | Age: 72
End: 2022-09-27
Payer: MEDICARE

## 2022-09-27 PROCEDURE — 77412 RADIATION TX DELIVERY LVL 3: CPT | Mod: PN | Performed by: RADIOLOGY

## 2022-09-27 PROCEDURE — G6002 STEREOSCOPIC X-RAY GUIDANCE: HCPCS | Mod: 26,,, | Performed by: RADIOLOGY

## 2022-09-27 PROCEDURE — G6002 PR STEREOSCOPIC XRAY GUIDE FOR RADIATION TX DELIV: ICD-10-PCS | Mod: 26,,, | Performed by: RADIOLOGY

## 2022-09-27 PROCEDURE — 77387 GUIDANCE FOR RADJ TX DLVR: CPT | Mod: TC,PN | Performed by: RADIOLOGY

## 2022-09-27 NOTE — PLAN OF CARE
Completed 4 of 16 outpatient radiation treatments without difficulty.  No questions or concerns this visit.

## 2022-09-28 PROCEDURE — 77336 RADIATION PHYSICS CONSULT: CPT | Mod: PN | Performed by: RADIOLOGY

## 2022-09-28 PROCEDURE — 77387 GUIDANCE FOR RADJ TX DLVR: CPT | Mod: TC,PN | Performed by: RADIOLOGY

## 2022-09-28 PROCEDURE — G6002 PR STEREOSCOPIC XRAY GUIDE FOR RADIATION TX DELIV: ICD-10-PCS | Mod: 26,,, | Performed by: RADIOLOGY

## 2022-09-28 PROCEDURE — 77412 RADIATION TX DELIVERY LVL 3: CPT | Mod: PN | Performed by: RADIOLOGY

## 2022-09-28 PROCEDURE — G6002 STEREOSCOPIC X-RAY GUIDANCE: HCPCS | Mod: 26,,, | Performed by: RADIOLOGY

## 2022-09-29 PROCEDURE — 77387 GUIDANCE FOR RADJ TX DLVR: CPT | Mod: TC,PN | Performed by: RADIOLOGY

## 2022-09-29 PROCEDURE — G6002 PR STEREOSCOPIC XRAY GUIDE FOR RADIATION TX DELIV: ICD-10-PCS | Mod: 26,,, | Performed by: RADIOLOGY

## 2022-09-29 PROCEDURE — G6002 STEREOSCOPIC X-RAY GUIDANCE: HCPCS | Mod: 26,,, | Performed by: RADIOLOGY

## 2022-09-29 PROCEDURE — 77412 RADIATION TX DELIVERY LVL 3: CPT | Mod: PN | Performed by: RADIOLOGY

## 2022-09-29 PROCEDURE — 77417 THER RADIOLOGY PORT IMAGE(S): CPT | Mod: PN | Performed by: RADIOLOGY

## 2022-09-30 ENCOUNTER — TELEPHONE (OUTPATIENT)
Dept: HEMATOLOGY/ONCOLOGY | Facility: CLINIC | Age: 72
End: 2022-09-30
Payer: MEDICARE

## 2022-09-30 PROCEDURE — G6002 STEREOSCOPIC X-RAY GUIDANCE: HCPCS | Mod: 26,,, | Performed by: RADIOLOGY

## 2022-09-30 PROCEDURE — 77412 RADIATION TX DELIVERY LVL 3: CPT | Mod: PN | Performed by: RADIOLOGY

## 2022-09-30 PROCEDURE — 77387 GUIDANCE FOR RADJ TX DLVR: CPT | Mod: TC,PN | Performed by: RADIOLOGY

## 2022-09-30 PROCEDURE — G6002 PR STEREOSCOPIC XRAY GUIDE FOR RADIATION TX DELIV: ICD-10-PCS | Mod: 26,,, | Performed by: RADIOLOGY

## 2022-09-30 NOTE — TELEPHONE ENCOUNTER
LVM to schedule a PT appt post rxt.      ----- Message from Ruperto Deras MA sent at 8/2/2022 11:55 AM CDT -----  Regarding: Post rxt PT  Call daughter to see when rxt will start/end to schedule PT    Rxt Simulation on 8/24    ----- Message -----  From: Ruperto Deras MA  Sent: 7/29/2022   3:31 PM CDT  To: Ruperto Deras MA    [3:26 PM] Cathy Guerra  Mrs. Agustina Crow was a prehab today. the pt will be having radiation but doesn't have a start date yet. I asked her daughter to call to schedule the PT follow up for after they are completed radiation.    like 1

## 2022-10-03 ENCOUNTER — HOSPITAL ENCOUNTER (OUTPATIENT)
Dept: RADIATION THERAPY | Facility: HOSPITAL | Age: 72
Discharge: HOME | End: 2022-10-03
Attending: RADIOLOGY
Payer: MEDICARE

## 2022-10-03 PROCEDURE — 77387 GUIDANCE FOR RADJ TX DLVR: CPT | Mod: TC,PN | Performed by: RADIOLOGY

## 2022-10-03 PROCEDURE — G6002 PR STEREOSCOPIC XRAY GUIDE FOR RADIATION TX DELIV: ICD-10-PCS | Mod: 26,,, | Performed by: RADIOLOGY

## 2022-10-03 PROCEDURE — G6002 STEREOSCOPIC X-RAY GUIDANCE: HCPCS | Mod: 26,,, | Performed by: RADIOLOGY

## 2022-10-03 PROCEDURE — 77412 RADIATION TX DELIVERY LVL 3: CPT | Mod: PN | Performed by: RADIOLOGY

## 2022-10-04 ENCOUNTER — DOCUMENTATION ONLY (OUTPATIENT)
Dept: RADIATION ONCOLOGY | Facility: CLINIC | Age: 72
End: 2022-10-04
Payer: MEDICARE

## 2022-10-04 PROCEDURE — 77412 RADIATION TX DELIVERY LVL 3: CPT | Mod: PN | Performed by: RADIOLOGY

## 2022-10-04 PROCEDURE — 77387 GUIDANCE FOR RADJ TX DLVR: CPT | Mod: TC,PN | Performed by: RADIOLOGY

## 2022-10-04 PROCEDURE — G6002 STEREOSCOPIC X-RAY GUIDANCE: HCPCS | Mod: 26,,, | Performed by: RADIOLOGY

## 2022-10-04 PROCEDURE — G6002 PR STEREOSCOPIC XRAY GUIDE FOR RADIATION TX DELIV: ICD-10-PCS | Mod: 26,,, | Performed by: RADIOLOGY

## 2022-10-04 NOTE — PLAN OF CARE
Completed 9 of 16 outpatient radiation treatments without difficulty.  All questions/concerns addressed by MD this visit.

## 2022-10-05 PROCEDURE — 77336 RADIATION PHYSICS CONSULT: CPT | Mod: PN | Performed by: RADIOLOGY

## 2022-10-05 PROCEDURE — 77387 GUIDANCE FOR RADJ TX DLVR: CPT | Mod: TC,PN | Performed by: RADIOLOGY

## 2022-10-05 PROCEDURE — G6002 PR STEREOSCOPIC XRAY GUIDE FOR RADIATION TX DELIV: ICD-10-PCS | Mod: 26,,, | Performed by: RADIOLOGY

## 2022-10-05 PROCEDURE — G6002 STEREOSCOPIC X-RAY GUIDANCE: HCPCS | Mod: 26,,, | Performed by: RADIOLOGY

## 2022-10-05 PROCEDURE — 77412 RADIATION TX DELIVERY LVL 3: CPT | Mod: PN | Performed by: RADIOLOGY

## 2022-10-06 PROCEDURE — G6002 STEREOSCOPIC X-RAY GUIDANCE: HCPCS | Mod: 26,,, | Performed by: RADIOLOGY

## 2022-10-06 PROCEDURE — 77417 THER RADIOLOGY PORT IMAGE(S): CPT | Mod: PN | Performed by: RADIOLOGY

## 2022-10-06 PROCEDURE — 77387 GUIDANCE FOR RADJ TX DLVR: CPT | Mod: TC,PN | Performed by: RADIOLOGY

## 2022-10-06 PROCEDURE — 77412 RADIATION TX DELIVERY LVL 3: CPT | Mod: PN | Performed by: RADIOLOGY

## 2022-10-06 PROCEDURE — G6002 PR STEREOSCOPIC XRAY GUIDE FOR RADIATION TX DELIV: ICD-10-PCS | Mod: 26,,, | Performed by: RADIOLOGY

## 2022-10-07 PROCEDURE — G6002 PR STEREOSCOPIC XRAY GUIDE FOR RADIATION TX DELIV: ICD-10-PCS | Mod: 26,,, | Performed by: RADIOLOGY

## 2022-10-07 PROCEDURE — G6002 STEREOSCOPIC X-RAY GUIDANCE: HCPCS | Mod: 26,,, | Performed by: RADIOLOGY

## 2022-10-07 PROCEDURE — 77412 RADIATION TX DELIVERY LVL 3: CPT | Mod: PN | Performed by: RADIOLOGY

## 2022-10-07 PROCEDURE — 77387 GUIDANCE FOR RADJ TX DLVR: CPT | Mod: TC,PN | Performed by: RADIOLOGY

## 2022-10-10 PROCEDURE — G6002 PR STEREOSCOPIC XRAY GUIDE FOR RADIATION TX DELIV: ICD-10-PCS | Mod: 26,,, | Performed by: RADIOLOGY

## 2022-10-10 PROCEDURE — 77412 RADIATION TX DELIVERY LVL 3: CPT | Mod: PN | Performed by: RADIOLOGY

## 2022-10-10 PROCEDURE — 77387 GUIDANCE FOR RADJ TX DLVR: CPT | Mod: TC,PN | Performed by: RADIOLOGY

## 2022-10-10 PROCEDURE — G6002 STEREOSCOPIC X-RAY GUIDANCE: HCPCS | Mod: 26,,, | Performed by: RADIOLOGY

## 2022-10-11 ENCOUNTER — DOCUMENTATION ONLY (OUTPATIENT)
Dept: RADIATION ONCOLOGY | Facility: CLINIC | Age: 72
End: 2022-10-11
Payer: MEDICARE

## 2022-10-11 PROCEDURE — 77387 GUIDANCE FOR RADJ TX DLVR: CPT | Mod: TC,PN | Performed by: RADIOLOGY

## 2022-10-11 PROCEDURE — G6002 PR STEREOSCOPIC XRAY GUIDE FOR RADIATION TX DELIV: ICD-10-PCS | Mod: 26,,, | Performed by: RADIOLOGY

## 2022-10-11 PROCEDURE — G6002 STEREOSCOPIC X-RAY GUIDANCE: HCPCS | Mod: 26,,, | Performed by: RADIOLOGY

## 2022-10-11 PROCEDURE — 77412 RADIATION TX DELIVERY LVL 3: CPT | Mod: PN | Performed by: RADIOLOGY

## 2022-10-11 NOTE — PLAN OF CARE
Completed 14 of 16 outpatient radiation treatments without difficulty.  No problems noted.  No questions or concerns at this time.

## 2022-10-12 PROCEDURE — 77387 GUIDANCE FOR RADJ TX DLVR: CPT | Mod: TC,PN | Performed by: RADIOLOGY

## 2022-10-12 PROCEDURE — 77412 RADIATION TX DELIVERY LVL 3: CPT | Mod: PN | Performed by: RADIOLOGY

## 2022-10-12 PROCEDURE — G6002 PR STEREOSCOPIC XRAY GUIDE FOR RADIATION TX DELIV: ICD-10-PCS | Mod: 26,,, | Performed by: RADIOLOGY

## 2022-10-12 PROCEDURE — G6002 STEREOSCOPIC X-RAY GUIDANCE: HCPCS | Mod: 26,,, | Performed by: RADIOLOGY

## 2022-10-13 ENCOUNTER — DOCUMENTATION ONLY (OUTPATIENT)
Dept: RADIATION ONCOLOGY | Facility: CLINIC | Age: 72
End: 2022-10-13
Payer: MEDICARE

## 2022-10-13 PROCEDURE — G6002 PR STEREOSCOPIC XRAY GUIDE FOR RADIATION TX DELIV: ICD-10-PCS | Mod: 26,,, | Performed by: RADIOLOGY

## 2022-10-13 PROCEDURE — G6002 STEREOSCOPIC X-RAY GUIDANCE: HCPCS | Mod: 26,,, | Performed by: RADIOLOGY

## 2022-10-13 PROCEDURE — 77412 RADIATION TX DELIVERY LVL 3: CPT | Mod: PN | Performed by: RADIOLOGY

## 2022-10-13 PROCEDURE — 77387 GUIDANCE FOR RADJ TX DLVR: CPT | Mod: TC,PN | Performed by: RADIOLOGY

## 2022-10-14 ENCOUNTER — HOSPITAL ENCOUNTER (OUTPATIENT)
Dept: RADIOLOGY | Facility: HOSPITAL | Age: 72
Discharge: HOME | End: 2022-10-14
Attending: STUDENT IN AN ORGANIZED HEALTH CARE EDUCATION/TRAINING PROGRAM
Payer: MEDICARE

## 2022-10-14 DIAGNOSIS — C50.912 INVASIVE DUCTAL CARCINOMA OF BREAST, FEMALE, LEFT: ICD-10-CM

## 2022-10-14 DIAGNOSIS — Z79.811 USE OF ANASTROZOLE: ICD-10-CM

## 2022-10-14 PROCEDURE — 77080 DEXA BONE DENSITY SPINE HIP: ICD-10-PCS | Mod: 26,,, | Performed by: RADIOLOGY

## 2022-10-14 PROCEDURE — 77080 DXA BONE DENSITY AXIAL: CPT | Mod: TC,PO

## 2022-10-14 PROCEDURE — 77336 RADIATION PHYSICS CONSULT: CPT | Mod: PN | Performed by: RADIOLOGY

## 2022-10-14 PROCEDURE — 77080 DXA BONE DENSITY AXIAL: CPT | Mod: 26,,, | Performed by: RADIOLOGY

## 2022-10-19 ENCOUNTER — OFFICE VISIT (OUTPATIENT)
Dept: HEMATOLOGY/ONCOLOGY | Facility: CLINIC | Age: 72
End: 2022-10-19
Payer: MEDICARE

## 2022-10-19 VITALS
OXYGEN SATURATION: 96 % | WEIGHT: 122.13 LBS | DIASTOLIC BLOOD PRESSURE: 84 MMHG | SYSTOLIC BLOOD PRESSURE: 142 MMHG | TEMPERATURE: 97 F | HEART RATE: 60 BPM | BODY MASS INDEX: 20.85 KG/M2 | HEIGHT: 64 IN | RESPIRATION RATE: 16 BRPM

## 2022-10-19 DIAGNOSIS — E11.65 UNCONTROLLED TYPE 2 DIABETES MELLITUS WITH HYPERGLYCEMIA: ICD-10-CM

## 2022-10-19 DIAGNOSIS — E78.2 MIXED HYPERLIPIDEMIA: ICD-10-CM

## 2022-10-19 DIAGNOSIS — M85.80 OSTEOPENIA AFTER MENOPAUSE: ICD-10-CM

## 2022-10-19 DIAGNOSIS — C50.912 INVASIVE DUCTAL CARCINOMA OF BREAST, FEMALE, LEFT: Primary | ICD-10-CM

## 2022-10-19 DIAGNOSIS — Z79.811 USE OF ANASTROZOLE: ICD-10-CM

## 2022-10-19 DIAGNOSIS — Z78.0 OSTEOPENIA AFTER MENOPAUSE: ICD-10-CM

## 2022-10-19 PROCEDURE — 1159F PR MEDICATION LIST DOCUMENTED IN MEDICAL RECORD: ICD-10-PCS | Mod: CPTII,S$GLB,,

## 2022-10-19 PROCEDURE — 99999 PR PBB SHADOW E&M-EST. PATIENT-LVL III: CPT | Mod: PBBFAC,,,

## 2022-10-19 PROCEDURE — 99215 PR OFFICE/OUTPT VISIT, EST, LEVL V, 40-54 MIN: ICD-10-PCS | Mod: S$GLB,,,

## 2022-10-19 PROCEDURE — 3288F PR FALLS RISK ASSESSMENT DOCUMENTED: ICD-10-PCS | Mod: CPTII,S$GLB,,

## 2022-10-19 PROCEDURE — 4010F ACE/ARB THERAPY RXD/TAKEN: CPT | Mod: CPTII,S$GLB,,

## 2022-10-19 PROCEDURE — 99999 PR PBB SHADOW E&M-EST. PATIENT-LVL III: ICD-10-PCS | Mod: PBBFAC,,,

## 2022-10-19 PROCEDURE — 3077F PR MOST RECENT SYSTOLIC BLOOD PRESSURE >= 140 MM HG: ICD-10-PCS | Mod: CPTII,S$GLB,,

## 2022-10-19 PROCEDURE — 3066F PR DOCUMENTATION OF TREATMENT FOR NEPHROPATHY: ICD-10-PCS | Mod: CPTII,S$GLB,,

## 2022-10-19 PROCEDURE — 4010F PR ACE/ARB THEARPY RXD/TAKEN: ICD-10-PCS | Mod: CPTII,S$GLB,,

## 2022-10-19 PROCEDURE — 3051F PR MOST RECENT HEMOGLOBIN A1C LEVEL 7.0 - < 8.0%: ICD-10-PCS | Mod: CPTII,S$GLB,,

## 2022-10-19 PROCEDURE — 3288F FALL RISK ASSESSMENT DOCD: CPT | Mod: CPTII,S$GLB,,

## 2022-10-19 PROCEDURE — 1101F PR PT FALLS ASSESS DOC 0-1 FALLS W/OUT INJ PAST YR: ICD-10-PCS | Mod: CPTII,S$GLB,,

## 2022-10-19 PROCEDURE — 3061F PR NEG MICROALBUMINURIA RESULT DOCUMENTED/REVIEW: ICD-10-PCS | Mod: CPTII,S$GLB,,

## 2022-10-19 PROCEDURE — 3077F SYST BP >= 140 MM HG: CPT | Mod: CPTII,S$GLB,,

## 2022-10-19 PROCEDURE — 3051F HG A1C>EQUAL 7.0%<8.0%: CPT | Mod: CPTII,S$GLB,,

## 2022-10-19 PROCEDURE — 99215 OFFICE O/P EST HI 40 MIN: CPT | Mod: S$GLB,,,

## 2022-10-19 PROCEDURE — 3079F PR MOST RECENT DIASTOLIC BLOOD PRESSURE 80-89 MM HG: ICD-10-PCS | Mod: CPTII,S$GLB,,

## 2022-10-19 PROCEDURE — 3066F NEPHROPATHY DOC TX: CPT | Mod: CPTII,S$GLB,,

## 2022-10-19 PROCEDURE — 1126F PR PAIN SEVERITY QUANTIFIED, NO PAIN PRESENT: ICD-10-PCS | Mod: CPTII,S$GLB,,

## 2022-10-19 PROCEDURE — 1101F PT FALLS ASSESS-DOCD LE1/YR: CPT | Mod: CPTII,S$GLB,,

## 2022-10-19 PROCEDURE — 1159F MED LIST DOCD IN RCRD: CPT | Mod: CPTII,S$GLB,,

## 2022-10-19 PROCEDURE — 1126F AMNT PAIN NOTED NONE PRSNT: CPT | Mod: CPTII,S$GLB,,

## 2022-10-19 PROCEDURE — 3079F DIAST BP 80-89 MM HG: CPT | Mod: CPTII,S$GLB,,

## 2022-10-19 PROCEDURE — 3061F NEG MICROALBUMINURIA REV: CPT | Mod: CPTII,S$GLB,,

## 2022-10-19 RX ORDER — ANASTROZOLE 1 MG/1
1 TABLET ORAL DAILY
Qty: 90 TABLET | Refills: 3 | Status: SHIPPED | OUTPATIENT
Start: 2022-10-19 | End: 2023-10-19

## 2022-10-19 NOTE — PROGRESS NOTES
PATIENT: Agustina Crow  MRN: [id number]  DATE: 10/19/2022      Diagnosis:   1. Invasive ductal carcinoma of breast, female, left    2. Use of anastrozole    3. Uncontrolled type 2 diabetes mellitus with hyperglycemia    4. Mixed hyperlipidemia    5. Osteopenia after menopause        Chief Complaint: Breast ca follow up     Subjective:   HPI: Ms. Crow is a 72 y.o. female who returns for follow up of invasive ductal carcinoma of the breast, left. She is accompanied by her son.     Today, she is feeling well. Completed XRT last week without difficulty. Had DEXA on 10/14/22 that showed osteopenia with 15% risk of a major osteoporotic fracture and a 2.8% risk of hip fracture in the next 10 years. She is currenlty not taking any calcium or vitamin D supplements.     Denies HA, SOB, cough, palpitations, CP, abd pain, changes in bowel habits, swelling, new lumps or bumps. Denies fevers, chills.     Oncology history:      Patient was initially seen as part of a Multi[facility]D clinic with Radiation Oncology,Surgical Oncology and Medical Oncology. Case was also presented at breast cancer tumor conference.      Howe a mass,6/22/22 628/22 mammogram/ Ultrasound ; Left 1200 2.9cm mass and another 1.2cm mass     7/6/22 US biopsy of both left lesions UIQ 1100 and LIQ; Left 1100 UIQ 4cm FN ;Grade 1 IDC ER 98% OK 9% Her 2 2+ FISH neg Ki 67 14%, Left 0800 LIQ 2cm FN hemangioma     MRI; left breast; 23 x 27 x 26 mm, corresponding to the biopsy proven malignancy and 11 x 7 x 13 mm, corresponding to the hemangioma.      8/2/22; Lumpectomy with SNL; pT2,pN0(sn)cM0 --> Stage IA     Oncotype 25; <1% chemotherapy benefit and 12% distant recurrence risk at 9 years with AI    10/13/22: Completed XRT to left breast     Oncology History   Invasive ductal carcinoma of breast, female, left   7/14/2022 Initial Diagnosis    Invasive ductal carcinoma of left breast in female     7/14/2022 Cancer Staged    Staging form: Breast, AJCC 8th Edition  - Clinical  stage from 7/14/2022: Stage IB (cT2, cN0, cM0, G1, ER+, AR+, HER2-)       8/6/2022 Cancer Staged    Staging form: Breast, AJCC 8th Edition  - Pathologic stage from 8/6/2022: Stage IA (pT2, pN0(sn), cM0, G1, ER+, AR+, HER2-)            Past Medical History:   Past Medical History:   Diagnosis Date    Diabetes mellitus     Hypertension        Past Surgical HIstory:   Past Surgical History:   Procedure Laterality Date    SENTINEL LYMPH NODE BIOPSY Left 8/2/2022    Procedure: BIOPSY, LYMPH NODE, SENTINEL;  Surgeon: Monie Krueger MD;  Location: [facility];  Service: General;  Laterality: Left;    SPINAL FUSION         Family History:   Family History   Problem Relation Age of Onset    Cancer Maternal Aunt         breast       Social History:  reports that she has never smoked. She has never used smokeless tobacco. She reports that she does not drink alcohol and does not use drugs.    Allergies:  Review of patient's allergies indicates:   Allergen Reactions    Pcn [penicillins] Anaphylaxis       Medications:  Current Outpatient Medications   Medication Sig Dispense Refill    cyanocobalamin (VITAMIN B-12) 100 MCG tablet Take 100 mcg by mouth once daily.      fenofibrate 160 MG Tab Take 1 tablet (160 mg total) by mouth once daily. 90 tablet 2    lisinopriL (PRINIVIL,ZESTRIL) 2.5 MG tablet Take 1 tablet (2.5 mg total) by mouth once daily. 90 tablet 1    metFORMIN (GLUCOPHAGE) 500 MG tablet Take 1 tablet (500 mg total) by mouth 2 (two) times daily with meals. 180 tablet 2    sertraline (ZOLOFT) 25 MG tablet Take 25 mg by mouth once daily.      sertraline (ZOLOFT) 50 MG tablet Take 0.5 tablets (25 mg total) by mouth once daily. (Patient taking differently: Take 50 mg by mouth once daily.) 90 tablet 3    traMADoL (ULTRAM) 50 mg tablet Take 1-2 tablets ( mg total) by mouth every 6 (six) hours. 30 tablet 0    aspirin (ECOTRIN) 81 MG EC tablet Take 1 tablet (81 mg total) by mouth once daily.  0    blood sugar diagnostic  "Strp 1 strip by Misc.(Non-Drug; Combo Route) route once daily. (Patient not taking: Reported on 10/19/2022) 90 strip 3    blood-glucose meter Misc 1 Device by Misc.(Non-Drug; Combo Route) route once daily. for 1 day 1 each 0    lancets Misc 1 lancet by Misc.(Non-Drug; Combo Route) route once daily. (Patient not taking: Reported on 10/19/2022) 90 each 3     No current facility-administered medications for this visit.     Facility-Administered Medications Ordered in Other Visits   Medication Dose Route Frequency Provider Last Rate Last Admin    lactated ringers infusion   Intravenous Continuous Monie Krueger  mL/hr at 08/02/22 0708 New Bag at 08/02/22 0953       Review of Systems   Constitutional:  Negative for activity change, appetite change, chills, fatigue and fever.   HENT:  Negative for dental problem and trouble swallowing.    Respiratory:  Negative for cough and shortness of breath.    Gastrointestinal:  Negative for abdominal pain, constipation, diarrhea and nausea.   Genitourinary:  Negative for difficulty urinating.   Musculoskeletal:  Negative for arthralgias and myalgias.   Skin:  Negative for rash.        Radiation changes left breast   Neurological:  Negative for headaches.   Psychiatric/Behavioral:  The patient is not nervous/anxious.      ECOG Performance Status:   ECOG SCORE    0 - Fully active-able to carry on all pre-disease performance without restriction         Objective:      Vitals:   Vitals:    10/19/22 1027   BP: (!) 142/84   BP Location: Right arm   Patient Position: Sitting   BP Method: Medium (Manual)   Pulse: 60   Resp: 16   Temp: 97.4 °F (36.3 °C)   TempSrc: Temporal   SpO2: 96%   Weight: 55.4 kg (122 lb 2.2 oz)   Height: 5' 4" (1.626 m)     BMI: Body mass index is 20.96 kg/m².    Physical Exam  Vitals reviewed.   Constitutional:       General: She is not in acute distress.     Appearance: She is not diaphoretic.   HENT:      Head: Normocephalic and atraumatic.   Eyes:      " General: No scleral icterus.  Cardiovascular:      Rate and Rhythm: Regular rhythm.   Chest:   Breasts:     Right: Normal. No mass or tenderness.      Left: No mass or tenderness.      Comments: Left sided, lumpectomy scare, erythema s/p XRT   Abdominal:      General: Bowel sounds are normal. There is no distension.      Tenderness: There is no abdominal tenderness.   Musculoskeletal:      Cervical back: No tenderness.      Right lower leg: No edema.      Left lower leg: No edema.   Lymphadenopathy:      Cervical: No cervical adenopathy.   Skin:     General: Skin is warm.      Findings: No bruising, lesion or rash.      Comments: Left breast, radiation changes, erythema   Neurological:      Mental Status: She is alert and oriented to person, place, and time.      Gait: Gait normal.   Psychiatric:         Mood and Affect: Mood normal.         Behavior: Behavior normal.       Laboratory Data:  Lab Results   Component Value Date    WBC 6.06 08/02/2022    HGB 13.0 08/02/2022    HCT 40.4 08/02/2022    MCV 90 08/02/2022     08/02/2022          Imaging: Results for orders placed during the hospital encounter of 10/14/22    BONE MIN DENSITY    Narrative  EXAMINATION:  DEXA BONE DENSITY SPINE HIP    CLINICAL HISTORY:  Malignant neoplasm of unspecified site of left female breast    TECHNIQUE:  DXA scanning was performed over the left hip and lumbar spine.  Review of the images confirms satisfactory positioning and technique.    COMPARISON:  None    FINDINGS:  The L1 to L4 vertebral bone mineral density is equal to 0.936 g/cm squared with a T score of -1.0.    The left femoral neck bone mineral density is equal to 0.673 g/cm squared with a T score of -1.6.    There is a 15% risk of a major osteoporotic fracture and a 2.8% risk of hip fracture in the next 10 years (FRAX).    Impression  Osteopenia of the left hip, normal bone mineral density of the lumbar spine    Consider FDA approved medical therapies in postmenopausal  women and men aged 50 years and older, based on the following:    *A hip or vertebral (clinical or morphometric) fracture  *T score less than or equal to -2.5 at the femoral neck or spine after appropriate evaluation to exclude secondary causes.  *Low bone mass -- also known as osteopenia (T score between -1.0 and -2.5 at the femoral neck or spine) and a 10 year probability of hip fracture greater than or equal to 3% or a 10 year probability of major osteoporosis-related fracture greater than or equal to 20% based on the [place]-adapted WHO algorithm.  *Clinicians judgment and/or patient preference may indicate treatment for people with 10 year fracture probabilities is above or below these levels.      Electronically signed by: Zachery Mantilla MD  Date:    10/14/2022  Time:    09:08      Assessment:       1. Invasive ductal carcinoma of breast, female, left    2. Use of anastrozole    3. Uncontrolled type 2 diabetes mellitus with hyperglycemia    4. Mixed hyperlipidemia    5. Osteopenia after menopause         Plan:   Stage IB IDC, ER/DE+,HER-2 negative  -8/2/22; Lumpectomy with SNL; pT2,pN0(sn)cM0 --> Stage IA    -Patient is candidate for adjuvant endocrine therapy (>60) , post menopausal, will benefit from AI for 5 years., Oncotype was 25; <1% chemotherapy benefit and 12% distant recurrence risk at 9 years with AI, less likely patient will benefit from chemotherapy especially given her other co-morbidities, will start her on anastrozole after finishing adjuvant XRT.   - >60 post menopausal, no need for Estradiol/FSH.   -Discussed with patient side effect : hot flashes,arthralgia, mood disturbances, osteoporosis, insomnia, peripheral edema  -Discussed 5 vs 10 years, will likely benefit from 5 years adjuvant endocrine therapy  -DEXA 10/14/22 hat showed osteopenia with 15% risk of a major osteoporotic fracture and a 2.8% risk of hip fracture in the next 10 years.  -Will start on anastrazole daily, if unable to tolerate  we will be able to move to other AI options (letrozole,exemestane) or tamoxifine; RX sent to pharmacy today  -Follow up in 3 months with CBC, CMP prior    Hyperlipidemia:  -Will need to monitor annually while on AI; due 2/2023  -Taking fenofibrate  -Per PCP      DM  -Stable, controlled  -following up with PCP     Osteopenia   -DEXA 10/14/22: showed osteopenia with 15% risk of a major osteoporotic fracture and a 2.8% risk of hip fracture in the next 10 years.  -Patient will start Calcium and Vitamin D supplements daily  -Repeat DEXA 2 years     Assessment/Plan reviewed and approved by Dr. Zhang  Patient queried and all questions were answered    Route Chart for Scheduling    Med Onc Chart Routing      Follow up with physician 3 months. With CBC, CMP prior to visit   Follow up with [facility]    Infusion scheduling note    Injection scheduling note    Labs CBC and CMP   Lab interval:  prior to visit   Imaging    Pharmacy appointment    Other referrals

## 2022-11-11 PROBLEM — Z17.0 MALIGNANT NEOPLASM OF UPPER-INNER QUADRANT OF LEFT BREAST IN FEMALE, ESTROGEN RECEPTOR POSITIVE: Status: ACTIVE | Noted: 2022-11-11

## 2022-11-11 PROBLEM — C50.212 MALIGNANT NEOPLASM OF UPPER-INNER QUADRANT OF LEFT BREAST IN FEMALE, ESTROGEN RECEPTOR POSITIVE: Status: ACTIVE | Noted: 2022-11-11

## 2022-11-18 ENCOUNTER — OFFICE VISIT (OUTPATIENT)
Dept: RADIATION ONCOLOGY | Facility: CLINIC | Age: 72
End: 2022-11-18
Payer: MEDICARE

## 2022-11-18 VITALS
SYSTOLIC BLOOD PRESSURE: 137 MMHG | WEIGHT: 126.13 LBS | DIASTOLIC BLOOD PRESSURE: 63 MMHG | TEMPERATURE: 99 F | HEART RATE: 80 BPM | OXYGEN SATURATION: 99 % | RESPIRATION RATE: 16 BRPM | BODY MASS INDEX: 21.53 KG/M2 | HEIGHT: 64 IN

## 2022-11-18 DIAGNOSIS — C50.212 MALIGNANT NEOPLASM OF UPPER-INNER QUADRANT OF LEFT BREAST IN FEMALE, ESTROGEN RECEPTOR POSITIVE: ICD-10-CM

## 2022-11-18 DIAGNOSIS — Z17.0 MALIGNANT NEOPLASM OF UPPER-INNER QUADRANT OF LEFT BREAST IN FEMALE, ESTROGEN RECEPTOR POSITIVE: ICD-10-CM

## 2022-11-18 DIAGNOSIS — C50.912 INVASIVE DUCTAL CARCINOMA OF BREAST, FEMALE, LEFT: Primary | ICD-10-CM

## 2022-11-18 PROCEDURE — 3051F HG A1C>EQUAL 7.0%<8.0%: CPT | Mod: CPTII,S$GLB,, | Performed by: RADIOLOGY

## 2022-11-18 PROCEDURE — 1126F PR PAIN SEVERITY QUANTIFIED, NO PAIN PRESENT: ICD-10-PCS | Mod: CPTII,S$GLB,, | Performed by: RADIOLOGY

## 2022-11-18 PROCEDURE — 1159F MED LIST DOCD IN RCRD: CPT | Mod: CPTII,S$GLB,, | Performed by: RADIOLOGY

## 2022-11-18 PROCEDURE — 1159F PR MEDICATION LIST DOCUMENTED IN MEDICAL RECORD: ICD-10-PCS | Mod: CPTII,S$GLB,, | Performed by: RADIOLOGY

## 2022-11-18 PROCEDURE — 3061F PR NEG MICROALBUMINURIA RESULT DOCUMENTED/REVIEW: ICD-10-PCS | Mod: CPTII,S$GLB,, | Performed by: RADIOLOGY

## 2022-11-18 PROCEDURE — 99999 PR PBB SHADOW E&M-EST. PATIENT-LVL IV: CPT | Mod: PBBFAC,,, | Performed by: RADIOLOGY

## 2022-11-18 PROCEDURE — 99999 PR PBB SHADOW E&M-EST. PATIENT-LVL IV: ICD-10-PCS | Mod: PBBFAC,,, | Performed by: RADIOLOGY

## 2022-11-18 PROCEDURE — 3078F DIAST BP <80 MM HG: CPT | Mod: CPTII,S$GLB,, | Performed by: RADIOLOGY

## 2022-11-18 PROCEDURE — 99214 PR OFFICE/OUTPT VISIT, EST, LEVL IV, 30-39 MIN: ICD-10-PCS | Mod: S$GLB,,, | Performed by: RADIOLOGY

## 2022-11-18 PROCEDURE — 3078F PR MOST RECENT DIASTOLIC BLOOD PRESSURE < 80 MM HG: ICD-10-PCS | Mod: CPTII,S$GLB,, | Performed by: RADIOLOGY

## 2022-11-18 PROCEDURE — 4010F PR ACE/ARB THEARPY RXD/TAKEN: ICD-10-PCS | Mod: CPTII,S$GLB,, | Performed by: RADIOLOGY

## 2022-11-18 PROCEDURE — 3061F NEG MICROALBUMINURIA REV: CPT | Mod: CPTII,S$GLB,, | Performed by: RADIOLOGY

## 2022-11-18 PROCEDURE — 3288F FALL RISK ASSESSMENT DOCD: CPT | Mod: CPTII,S$GLB,, | Performed by: RADIOLOGY

## 2022-11-18 PROCEDURE — 1101F PR PT FALLS ASSESS DOC 0-1 FALLS W/OUT INJ PAST YR: ICD-10-PCS | Mod: CPTII,S$GLB,, | Performed by: RADIOLOGY

## 2022-11-18 PROCEDURE — 4010F ACE/ARB THERAPY RXD/TAKEN: CPT | Mod: CPTII,S$GLB,, | Performed by: RADIOLOGY

## 2022-11-18 PROCEDURE — 1101F PT FALLS ASSESS-DOCD LE1/YR: CPT | Mod: CPTII,S$GLB,, | Performed by: RADIOLOGY

## 2022-11-18 PROCEDURE — 3008F BODY MASS INDEX DOCD: CPT | Mod: CPTII,S$GLB,, | Performed by: RADIOLOGY

## 2022-11-18 PROCEDURE — 3008F PR BODY MASS INDEX (BMI) DOCUMENTED: ICD-10-PCS | Mod: CPTII,S$GLB,, | Performed by: RADIOLOGY

## 2022-11-18 PROCEDURE — 3288F PR FALLS RISK ASSESSMENT DOCUMENTED: ICD-10-PCS | Mod: CPTII,S$GLB,, | Performed by: RADIOLOGY

## 2022-11-18 PROCEDURE — 99214 OFFICE O/P EST MOD 30 MIN: CPT | Mod: S$GLB,,, | Performed by: RADIOLOGY

## 2022-11-18 PROCEDURE — 3075F SYST BP GE 130 - 139MM HG: CPT | Mod: CPTII,S$GLB,, | Performed by: RADIOLOGY

## 2022-11-18 PROCEDURE — 3075F PR MOST RECENT SYSTOLIC BLOOD PRESS GE 130-139MM HG: ICD-10-PCS | Mod: CPTII,S$GLB,, | Performed by: RADIOLOGY

## 2022-11-18 PROCEDURE — 1126F AMNT PAIN NOTED NONE PRSNT: CPT | Mod: CPTII,S$GLB,, | Performed by: RADIOLOGY

## 2022-11-18 PROCEDURE — 3051F PR MOST RECENT HEMOGLOBIN A1C LEVEL 7.0 - < 8.0%: ICD-10-PCS | Mod: CPTII,S$GLB,, | Performed by: RADIOLOGY

## 2022-11-18 PROCEDURE — 3066F PR DOCUMENTATION OF TREATMENT FOR NEPHROPATHY: ICD-10-PCS | Mod: CPTII,S$GLB,, | Performed by: RADIOLOGY

## 2022-11-18 PROCEDURE — 3066F NEPHROPATHY DOC TX: CPT | Mod: CPTII,S$GLB,, | Performed by: RADIOLOGY

## 2022-11-18 NOTE — PROGRESS NOTES
[facility] Department of Radiation Oncology  Follow Up Visit Note    Diagnosis:  Agustina Crow is a 72 y.o. female with a(n) Stage IA (pT2 N0, G1, +/+/-) invasive ductal carcinoma of the LEFT breast, status post lumpectomy and 42.56Gy adjuvant radiation therapy completed 10/13/22.    Oncologic History:  Oncology History   Invasive ductal carcinoma of breast, female, left   7/14/2022 Initial Diagnosis    Invasive ductal carcinoma of left breast in female     7/14/2022 Cancer Staged    Staging form: Breast, AJCC 8th Edition  - Clinical stage from 7/14/2022: Stage IB (cT2, cN0, cM0, G1, ER+, NM+, HER2-)       8/6/2022 Cancer Staged    Staging form: Breast, AJCC 8th Edition  - Pathologic stage from 8/6/2022: Stage IA (pT2, pN0(sn), cM0, G1, ER+, NM+, HER2-)            Interval History  The patient presents today for a regularly scheduled follow up visit.  She was last seen in our clinic on 10/13/22 at completion of therapy.   Since that time, she has noted darkening of seb.derm under treated breast.  Also ongoing axillary pain (sharp, sudden), uncertain if improving or not, worse at night.   Hasn't started anastrozole (family will check to see if picked up).    Review of Systems   Review of Systems   Constitutional:  Positive for malaise/fatigue. Negative for chills and fever.   Eyes:  Positive for blurred vision (plan for cataract surgery).   Cardiovascular:  Positive for chest pain (axillary pain).   Musculoskeletal:  Negative for back pain and myalgias.   Neurological:  Negative for dizziness, focal weakness and headaches.   Psychiatric/Behavioral:  Positive for memory loss.      Social History:  Social History     Tobacco Use    Smoking status: Never    Smokeless tobacco: Never   Substance Use Topics    Alcohol use: Never    Drug use: Never       Family History:  Cancer-related family history includes Cancer in her maternal aunt.    Exam:  Vitals:    11/18/22 1301   BP: 137/63   BP Location:  "Right arm   Patient Position: Sitting   Pulse: 80   Resp: 16   Temp: 99.4 °F (37.4 °C)   SpO2: 99%   Weight: 57.2 kg (126 lb 1.7 oz)   Height: 5' 4" (1.626 m)     Constitutional: Pleasant 72 y.o. female in no acute distress.  Well nourished. Well groomed.   HEENT: Normocephalic and atraumatic   Lungs: No audible wheezing.  Normal effort.   Breast/Chest wall: Treated area with ongoing skin changes- dry skin, no desquamation, no regional lymphadenopathy, + hyperpigmented seb derms in IM fold.  Musculoskeletal: No gross MSK deformities. Ambulates independently  Skin: No rashes appreciated.   Psych: Alert and oriented with appropriate mood and affect.  Neuro:   Grossly normal.      Assessment:  Recovering from acuter radiation therapy-related changes well  No evidence of disease persistence or recurrence in treated region  ECOG: (1) Restricted in physically strenuous activity, ambulatory and able to do work of light nature    Plan:  Follow up in 6 months  Gentle exfoliation to upper breast  Routine breast imaging in 6 months (ordered by Surgery)  Follow up after mammogram  Skincare/sun protection reviewed  She was given our contact information, and she was told that she could call our clinic at anytime if she has any questions or concerns.  Follow up with other providers as directed     35 min spent on patient care    "

## 2023-01-18 ENCOUNTER — OFFICE VISIT (OUTPATIENT)
Dept: HEMATOLOGY/ONCOLOGY | Facility: CLINIC | Age: 73
End: 2023-01-18
Payer: MEDICARE

## 2023-01-18 ENCOUNTER — LAB VISIT (OUTPATIENT)
Dept: LAB | Facility: HOSPITAL | Age: 73
End: 2023-01-18
Payer: MEDICARE

## 2023-01-18 VITALS
BODY MASS INDEX: 21.84 KG/M2 | DIASTOLIC BLOOD PRESSURE: 76 MMHG | RESPIRATION RATE: 16 BRPM | WEIGHT: 123.25 LBS | HEART RATE: 73 BPM | TEMPERATURE: 97 F | HEIGHT: 63 IN | SYSTOLIC BLOOD PRESSURE: 149 MMHG | OXYGEN SATURATION: 93 %

## 2023-01-18 DIAGNOSIS — F32.1 CURRENT MODERATE EPISODE OF MAJOR DEPRESSIVE DISORDER WITHOUT PRIOR EPISODE: ICD-10-CM

## 2023-01-18 DIAGNOSIS — N18.31 CHRONIC KIDNEY DISEASE, STAGE 3A: ICD-10-CM

## 2023-01-18 DIAGNOSIS — C50.212 MALIGNANT NEOPLASM OF UPPER-INNER QUADRANT OF LEFT BREAST IN FEMALE, ESTROGEN RECEPTOR POSITIVE: ICD-10-CM

## 2023-01-18 DIAGNOSIS — E11.65 UNCONTROLLED TYPE 2 DIABETES MELLITUS WITH HYPERGLYCEMIA: ICD-10-CM

## 2023-01-18 DIAGNOSIS — C50.912 INVASIVE DUCTAL CARCINOMA OF BREAST, FEMALE, LEFT: Primary | ICD-10-CM

## 2023-01-18 DIAGNOSIS — C50.912 INVASIVE DUCTAL CARCINOMA OF BREAST, FEMALE, LEFT: ICD-10-CM

## 2023-01-18 DIAGNOSIS — Z17.0 MALIGNANT NEOPLASM OF UPPER-INNER QUADRANT OF LEFT BREAST IN FEMALE, ESTROGEN RECEPTOR POSITIVE: ICD-10-CM

## 2023-01-18 LAB
ALBUMIN SERPL BCP-MCNC: 4 G/DL (ref 3.5–5.2)
ALP SERPL-CCNC: 44 U/L (ref 55–135)
ALT SERPL W/O P-5'-P-CCNC: 11 U/L (ref 10–44)
ANION GAP SERPL CALC-SCNC: 11 MMOL/L (ref 8–16)
AST SERPL-CCNC: 13 U/L (ref 10–40)
BASOPHILS # BLD AUTO: 0.06 K/UL (ref 0–0.2)
BASOPHILS NFR BLD: 1 % (ref 0–1.9)
BILIRUB SERPL-MCNC: 0.3 MG/DL (ref 0.1–1)
BUN SERPL-MCNC: 19 MG/DL (ref 8–23)
CALCIUM SERPL-MCNC: 9.8 MG/DL (ref 8.7–10.5)
CHLORIDE SERPL-SCNC: 108 MMOL/L (ref 95–110)
CO2 SERPL-SCNC: 20 MMOL/L (ref 23–29)
CREAT SERPL-MCNC: 1 MG/DL (ref 0.5–1.4)
DIFFERENTIAL METHOD: ABNORMAL
EOSINOPHIL # BLD AUTO: 0.1 K/UL (ref 0–0.5)
EOSINOPHIL NFR BLD: 1.5 % (ref 0–8)
ERYTHROCYTE [DISTWIDTH] IN BLOOD BY AUTOMATED COUNT: 12.9 % (ref 11.5–14.5)
EST. GFR  (NO RACE VARIABLE): 59.1 ML/MIN/1.73 M^2
GLUCOSE SERPL-MCNC: 176 MG/DL (ref 70–110)
HCT VFR BLD AUTO: 40 % (ref 37–48.5)
HGB BLD-MCNC: 13.4 G/DL (ref 12–16)
IMM GRANULOCYTES # BLD AUTO: 0.04 K/UL (ref 0–0.04)
IMM GRANULOCYTES NFR BLD AUTO: 0.6 % (ref 0–0.5)
LYMPHOCYTES # BLD AUTO: 1.9 K/UL (ref 1–4.8)
LYMPHOCYTES NFR BLD: 30.3 % (ref 18–48)
MCH RBC QN AUTO: 29.7 PG (ref 27–31)
MCHC RBC AUTO-ENTMCNC: 33.5 G/DL (ref 32–36)
MCV RBC AUTO: 89 FL (ref 82–98)
MONOCYTES # BLD AUTO: 0.4 K/UL (ref 0.3–1)
MONOCYTES NFR BLD: 6.6 % (ref 4–15)
NEUTROPHILS # BLD AUTO: 3.7 K/UL (ref 1.8–7.7)
NEUTROPHILS NFR BLD: 60 % (ref 38–73)
NRBC BLD-RTO: 0 /100 WBC
PLATELET # BLD AUTO: 242 K/UL (ref 150–450)
PMV BLD AUTO: 8.6 FL (ref 9.2–12.9)
POTASSIUM SERPL-SCNC: 4.6 MMOL/L (ref 3.5–5.1)
PROT SERPL-MCNC: 7 G/DL (ref 6–8.4)
RBC # BLD AUTO: 4.51 M/UL (ref 4–5.4)
SODIUM SERPL-SCNC: 139 MMOL/L (ref 136–145)
WBC # BLD AUTO: 6.18 K/UL (ref 3.9–12.7)

## 2023-01-18 PROCEDURE — 1101F PT FALLS ASSESS-DOCD LE1/YR: CPT | Mod: CPTII,S$GLB,, | Performed by: STUDENT IN AN ORGANIZED HEALTH CARE EDUCATION/TRAINING PROGRAM

## 2023-01-18 PROCEDURE — 1159F PR MEDICATION LIST DOCUMENTED IN MEDICAL RECORD: ICD-10-PCS | Mod: CPTII,S$GLB,, | Performed by: STUDENT IN AN ORGANIZED HEALTH CARE EDUCATION/TRAINING PROGRAM

## 2023-01-18 PROCEDURE — 36415 COLL VENOUS BLD VENIPUNCTURE: CPT | Mod: PN

## 2023-01-18 PROCEDURE — 3077F PR MOST RECENT SYSTOLIC BLOOD PRESSURE >= 140 MM HG: ICD-10-PCS | Mod: CPTII,S$GLB,, | Performed by: STUDENT IN AN ORGANIZED HEALTH CARE EDUCATION/TRAINING PROGRAM

## 2023-01-18 PROCEDURE — 3078F DIAST BP <80 MM HG: CPT | Mod: CPTII,S$GLB,, | Performed by: STUDENT IN AN ORGANIZED HEALTH CARE EDUCATION/TRAINING PROGRAM

## 2023-01-18 PROCEDURE — 80053 COMPREHEN METABOLIC PANEL: CPT | Mod: PN

## 2023-01-18 PROCEDURE — 3288F PR FALLS RISK ASSESSMENT DOCUMENTED: ICD-10-PCS | Mod: CPTII,S$GLB,, | Performed by: STUDENT IN AN ORGANIZED HEALTH CARE EDUCATION/TRAINING PROGRAM

## 2023-01-18 PROCEDURE — 3077F SYST BP >= 140 MM HG: CPT | Mod: CPTII,S$GLB,, | Performed by: STUDENT IN AN ORGANIZED HEALTH CARE EDUCATION/TRAINING PROGRAM

## 2023-01-18 PROCEDURE — 99215 OFFICE O/P EST HI 40 MIN: CPT | Mod: S$GLB,,, | Performed by: STUDENT IN AN ORGANIZED HEALTH CARE EDUCATION/TRAINING PROGRAM

## 2023-01-18 PROCEDURE — 1126F AMNT PAIN NOTED NONE PRSNT: CPT | Mod: CPTII,S$GLB,, | Performed by: STUDENT IN AN ORGANIZED HEALTH CARE EDUCATION/TRAINING PROGRAM

## 2023-01-18 PROCEDURE — 99999 PR PBB SHADOW E&M-EST. PATIENT-LVL IV: ICD-10-PCS | Mod: PBBFAC,,, | Performed by: STUDENT IN AN ORGANIZED HEALTH CARE EDUCATION/TRAINING PROGRAM

## 2023-01-18 PROCEDURE — 1160F RVW MEDS BY RX/DR IN RCRD: CPT | Mod: CPTII,S$GLB,, | Performed by: STUDENT IN AN ORGANIZED HEALTH CARE EDUCATION/TRAINING PROGRAM

## 2023-01-18 PROCEDURE — 99999 PR PBB SHADOW E&M-EST. PATIENT-LVL IV: CPT | Mod: PBBFAC,,, | Performed by: STUDENT IN AN ORGANIZED HEALTH CARE EDUCATION/TRAINING PROGRAM

## 2023-01-18 PROCEDURE — 3008F BODY MASS INDEX DOCD: CPT | Mod: CPTII,S$GLB,, | Performed by: STUDENT IN AN ORGANIZED HEALTH CARE EDUCATION/TRAINING PROGRAM

## 2023-01-18 PROCEDURE — 1126F PR PAIN SEVERITY QUANTIFIED, NO PAIN PRESENT: ICD-10-PCS | Mod: CPTII,S$GLB,, | Performed by: STUDENT IN AN ORGANIZED HEALTH CARE EDUCATION/TRAINING PROGRAM

## 2023-01-18 PROCEDURE — 99215 PR OFFICE/OUTPT VISIT, EST, LEVL V, 40-54 MIN: ICD-10-PCS | Mod: S$GLB,,, | Performed by: STUDENT IN AN ORGANIZED HEALTH CARE EDUCATION/TRAINING PROGRAM

## 2023-01-18 PROCEDURE — 1160F PR REVIEW ALL MEDS BY PRESCRIBER/CLIN PHARMACIST DOCUMENTED: ICD-10-PCS | Mod: CPTII,S$GLB,, | Performed by: STUDENT IN AN ORGANIZED HEALTH CARE EDUCATION/TRAINING PROGRAM

## 2023-01-18 PROCEDURE — 85025 COMPLETE CBC W/AUTO DIFF WBC: CPT | Mod: PN

## 2023-01-18 PROCEDURE — 3078F PR MOST RECENT DIASTOLIC BLOOD PRESSURE < 80 MM HG: ICD-10-PCS | Mod: CPTII,S$GLB,, | Performed by: STUDENT IN AN ORGANIZED HEALTH CARE EDUCATION/TRAINING PROGRAM

## 2023-01-18 PROCEDURE — 1159F MED LIST DOCD IN RCRD: CPT | Mod: CPTII,S$GLB,, | Performed by: STUDENT IN AN ORGANIZED HEALTH CARE EDUCATION/TRAINING PROGRAM

## 2023-01-18 PROCEDURE — 3008F PR BODY MASS INDEX (BMI) DOCUMENTED: ICD-10-PCS | Mod: CPTII,S$GLB,, | Performed by: STUDENT IN AN ORGANIZED HEALTH CARE EDUCATION/TRAINING PROGRAM

## 2023-01-18 PROCEDURE — 1101F PR PT FALLS ASSESS DOC 0-1 FALLS W/OUT INJ PAST YR: ICD-10-PCS | Mod: CPTII,S$GLB,, | Performed by: STUDENT IN AN ORGANIZED HEALTH CARE EDUCATION/TRAINING PROGRAM

## 2023-01-18 PROCEDURE — 3288F FALL RISK ASSESSMENT DOCD: CPT | Mod: CPTII,S$GLB,, | Performed by: STUDENT IN AN ORGANIZED HEALTH CARE EDUCATION/TRAINING PROGRAM

## 2023-01-18 NOTE — PROGRESS NOTES
PATIENT: Agustina Crow  MRN: [id number]  DATE: 1/26/2023      Diagnosis:   1. Invasive ductal carcinoma of breast, female, left    2. Chronic kidney disease, stage 3a    3. Malignant neoplasm of upper-inner quadrant of left breast in female, estrogen receptor positive    4. Uncontrolled type 2 diabetes mellitus with hyperglycemia    5. Current moderate episode of major depressive disorder without prior episode      Chief Complaint: Breast ca follow up     Subjective:   HPI: Ms. Crow is a 72 y.o. female who returns for follow up of invasive ductal carcinoma of the breast, left. She is accompanied by her son.     Today,    Denies HA, SOB, cough, palpitations, CP, abd pain, changes in bowel habits, swelling, new lumps or bumps. Denies fevers, chills.     Oncology history:     Felt a mass,6/22/22 628/22 mammogram/ Ultrasound ; Left 1200 2.9cm mass and another 1.2cm mass     7/6/22 US biopsy of both left lesions UIQ 1100 and LIQ; Left 1100 UIQ 4cm FN ;Grade 1 IDC ER 98% NY 9% Her 2 2+ FISH neg Ki 67 14%, Left 0800 LIQ 2cm FN hemangioma     MRI; left breast; 23 x 27 x 26 mm, corresponding to the biopsy proven malignancy and 11 x 7 x 13 mm, corresponding to the hemangioma.      8/2/22; Lumpectomy with SNL; pT2,pN0(sn)cM0 --> Stage IA     Oncotype 25; <1% chemotherapy benefit and 12% distant recurrence risk at 9 years with AI    10/13/22: Completed XRT to left breast     Started adjuvant endocrine therapy ,Anastrazole 11/2022    Oncology History   Invasive ductal carcinoma of breast, female, left   7/14/2022 Initial Diagnosis    Invasive ductal carcinoma of left breast in female     7/14/2022 Cancer Staged    Staging form: Breast, AJCC 8th Edition  - Clinical stage from 7/14/2022: Stage IB (cT2, cN0, cM0, G1, ER+, NY+, HER2-)       8/6/2022 Cancer Staged    Staging form: Breast, AJCC 8th Edition  - Pathologic stage from 8/6/2022: Stage IA (pT2, pN0(sn), cM0, G1, ER+, NY+, HER2-)       9/22/2022 - 10/13/2022 Radiation  Therapy    Treating physician: Precious Rubio  Total Dose: 42.56 Gy  Fractions: 16    Treatment Site Ref. ID Energy Dose/Fx (Gy) #Fx Dose Correction (Gy) Total Dose (Gy) Start Date End Date Elapsed Days   Lt Breast NormEZCalc 6X 2.66 16 / 16 0 42.56 9/22/2022 10/13/2022 21           Past Medical History:   Past Medical History:   Diagnosis Date    Diabetes mellitus     Hypertension        Past Surgical HIstory:   Past Surgical History:   Procedure Laterality Date    SENTINEL LYMPH NODE BIOPSY Left 8/2/2022    Procedure: BIOPSY, LYMPH NODE, SENTINEL;  Surgeon: Monie Krueger MD;  Location: [facility];  Service: General;  Laterality: Left;    SPINAL FUSION         Family History:   Family History   Problem Relation Age of Onset    Cancer Maternal Aunt         breast       Social History:  reports that she has never smoked. She has never used smokeless tobacco. She reports that she does not drink alcohol and does not use drugs.    Allergies:  Review of patient's allergies indicates:   Allergen Reactions    Pcn [penicillins] Anaphylaxis       Medications:  Current Outpatient Medications   Medication Sig Dispense Refill    anastrozole (ARIMIDEX) 1 mg Tab Take 1 tablet (1 mg total) by mouth once daily. 90 tablet 3    cyanocobalamin (VITAMIN B-12) 100 MCG tablet Take 100 mcg by mouth once daily.      ezetimibe (ZETIA) 10 mg tablet Take 1 tablet (10 mg total) by mouth once daily. 90 tablet 3    fenofibrate 160 MG Tab Take 1 tablet (160 mg total) by mouth once daily. 90 tablet 2    lisinopriL (PRINIVIL,ZESTRIL) 2.5 MG tablet TAKE 1 TABLET BY MOUTH EVERY DAY 90 tablet 1    metFORMIN (GLUCOPHAGE) 500 MG tablet Take 1 tablet (500 mg total) by mouth 2 (two) times daily with meals. 180 tablet 2    sertraline (ZOLOFT) 50 MG tablet Take 1 tablet (50 mg total) by mouth once daily. 90 tablet 1    traMADoL (ULTRAM) 50 mg tablet Take 1-2 tablets ( mg total) by mouth every 6 (six) hours. 30 tablet 0    aspirin (ECOTRIN) 81  MG EC tablet Take 1 tablet (81 mg total) by mouth once daily.  0    blood sugar diagnostic Strp 1 strip by Misc.(Non-Drug; Combo Route) route once daily. (Patient not taking: Reported on 11/11/2022) 90 strip 3    blood-glucose meter Misc 1 Device by Misc.(Non-Drug; Combo Route) route once daily. for 1 day 1 each 0    lancets Misc 1 lancet by Misc.(Non-Drug; Combo Route) route once daily. (Patient not taking: Reported on 11/11/2022) 90 each 3     No current facility-administered medications for this visit.     Facility-Administered Medications Ordered in Other Visits   Medication Dose Route Frequency Provider Last Rate Last Admin    lactated ringers infusion   Intravenous Continuous Monie Krueger  mL/hr at 08/02/22 0708 New Bag at 08/02/22 0953       Review of Systems   Constitutional:  Negative for activity change, appetite change, chills, fatigue, fever and unexpected weight change.   HENT:  Negative for dental problem, mouth sores and trouble swallowing.    Eyes:  Negative for visual disturbance.   Respiratory:  Negative for cough and shortness of breath.    Cardiovascular:  Negative for chest pain.   Gastrointestinal:  Negative for abdominal pain, constipation, diarrhea and nausea.   Genitourinary:  Negative for difficulty urinating and frequency.   Musculoskeletal:  Negative for arthralgias, back pain and myalgias.   Skin:  Negative for rash.        Radiation changes left breast   Neurological:  Negative for headaches.   Hematological:  Negative for adenopathy.   Psychiatric/Behavioral:  The patient is not nervous/anxious.      ECOG Performance Status:   ECOG SCORE    1 - Restricted in strenuous activity-ambulatory and able to carry out work of a light nature         Objective:      Vitals:   Vitals:    01/18/23 1146   BP: (!) 149/76   BP Location: Left arm   Patient Position: Sitting   BP Method: Medium (Automatic)   Pulse: 73   Resp: 16   Temp: 97.4 °F (36.3 °C)   TempSrc: Temporal   SpO2: (!) 93%  "  Weight: 55.9 kg (123 lb 3.8 oz)   Height: 5' 3" (1.6 m)     BMI: Body mass index is 21.83 kg/m².    Physical Exam  Vitals reviewed.   Constitutional:       General: She is not in acute distress.     Appearance: She is not diaphoretic.   HENT:      Head: Normocephalic and atraumatic.   Eyes:      General: No scleral icterus.  Cardiovascular:      Rate and Rhythm: Regular rhythm.   Chest:   Breasts:     Right: Normal. No mass or tenderness.      Left: No mass or tenderness.      Comments: Left sided, lumpectomy scare, erythema s/p XRT   Abdominal:      General: Bowel sounds are normal. There is no distension.      Tenderness: There is no abdominal tenderness.   Musculoskeletal:      Cervical back: No tenderness.      Right lower leg: No edema.      Left lower leg: No edema.   Lymphadenopathy:      Cervical: No cervical adenopathy.   Skin:     General: Skin is warm.      Findings: No bruising, lesion or rash.      Comments: Left breast, radiation changes, erythema   Neurological:      Mental Status: She is alert and oriented to person, place, and time.      Gait: Gait normal.   Psychiatric:         Mood and Affect: Mood normal.         Behavior: Behavior normal.       Laboratory Data:  Lab Results   Component Value Date    WBC 6.18 01/18/2023    HGB 13.4 01/18/2023    HCT 40.0 01/18/2023    MCV 89 01/18/2023     01/18/2023          Imaging: Results for orders placed during the hospital encounter of 10/14/22    BONE MIN DENSITY    Narrative  EXAMINATION:  DEXA BONE DENSITY SPINE HIP    CLINICAL HISTORY:  Malignant neoplasm of unspecified site of left female breast    TECHNIQUE:  DXA scanning was performed over the left hip and lumbar spine.  Review of the images confirms satisfactory positioning and technique.    COMPARISON:  None    FINDINGS:  The L1 to L4 vertebral bone mineral density is equal to 0.936 g/cm squared with a T score of -1.0.    The left femoral neck bone mineral density is equal to 0.673 g/cm " squared with a T score of -1.6.    There is a 15% risk of a major osteoporotic fracture and a 2.8% risk of hip fracture in the next 10 years (FRAX).    Impression  Osteopenia of the left hip, normal bone mineral density of the lumbar spine    Consider FDA approved medical therapies in postmenopausal women and men aged 50 years and older, based on the following:    *A hip or vertebral (clinical or morphometric) fracture  *T score less than or equal to -2.5 at the femoral neck or spine after appropriate evaluation to exclude secondary causes.  *Low bone mass -- also known as osteopenia (T score between -1.0 and -2.5 at the femoral neck or spine) and a 10 year probability of hip fracture greater than or equal to 3% or a 10 year probability of major osteoporosis-related fracture greater than or equal to 20% based on the [place]-adapted WHO algorithm.  *Clinicians judgment and/or patient preference may indicate treatment for people with 10 year fracture probabilities is above or below these levels.      Electronically signed by: Zachery Mantilla MD  Date:    10/14/2022  Time:    09:08      Assessment:       1. Invasive ductal carcinoma of breast, female, left    2. Chronic kidney disease, stage 3a    3. Malignant neoplasm of upper-inner quadrant of left breast in female, estrogen receptor positive    4. Uncontrolled type 2 diabetes mellitus with hyperglycemia    5. Current moderate episode of major depressive disorder without prior episode      Plan:   Stage IB IDC, ER/IN+,HER-2 negative  -8/2/22; Lumpectomy with SNL; pT2,pN0(sn)cM0 --> Stage IA    -Patient is candidate for adjuvant endocrine therapy (>60) , post menopausal, will benefit from AI for 5 years., Oncotype was 25; <1% chemotherapy benefit and 12% distant recurrence risk at 9 years with AI, less likely patient will benefit from chemotherapy especially given her other co-morbidities,   - >60 post menopausal, no need for Estradiol/FSH.   -Discussed with patient side  effect : hot flashes,arthralgia, mood disturbances, osteoporosis, insomnia, peripheral edema  -Discussed 5 vs 10 years, will likely benefit from 5 years adjuvant endocrine therapy  - on anastrazole daily, if unable to tolerate we will be able to move to other AI options (letrozole,exemestane) or tamoxifine  - cont following up with every 3 months with H&P, Mammogram 6 months after radiation;April/2023      Hyperlipidemia:  -Will need to monitor annually while on AI; due 2/2023  -Taking fenofibrate, stable      DM  -Stable, controlled  -following up with PCP     Osteopenia   -DEXA 10/14/22: showed osteopenia with 15% risk of a major osteoporotic fracture and a 2.8% risk of hip fracture in the next 10 years.  -Patient will start Calcium and Vitamin D supplements daily  -Repeat DEXA in 10/2024, will discuss again bisphosphonate     CKD: stable, resolved, cont monitoring     Patient queried and all questions were answered    Route Chart for Scheduling    Med Onc Chart Routing      Follow up with physician 3 months.   Follow up with [facility]    Infusion scheduling note    Injection scheduling note    Labs    Imaging    Pharmacy appointment    Other referrals

## 2023-01-26 PROBLEM — F32.1 CURRENT MODERATE EPISODE OF MAJOR DEPRESSIVE DISORDER WITHOUT PRIOR EPISODE: Status: ACTIVE | Noted: 2023-01-26

## 2023-04-20 ENCOUNTER — TELEPHONE (OUTPATIENT)
Dept: HEMATOLOGY/ONCOLOGY | Facility: CLINIC | Age: 73
End: 2023-04-20
Payer: MEDICARE

## 2023-05-19 ENCOUNTER — TELEPHONE (OUTPATIENT)
Dept: RADIATION ONCOLOGY | Facility: CLINIC | Age: 73
End: 2023-05-19
Payer: MEDICARE

## 2023-05-19 NOTE — TELEPHONE ENCOUNTER
Called and spoke to renata Rodriguez appt to June 30th    ----- Message from Aly Benson sent at 5/19/2023 10:58 AM CDT -----    Type: Need Medical Advice   Who Called: daughter of patient   Best callback number: [phone]  Additional Information: Patient daughter called to reschedule appointment on 5/23 to the end of  June any day around 11:30am  Please call to further assist, Thanks

## 2023-06-02 ENCOUNTER — TELEPHONE (OUTPATIENT)
Dept: HEMATOLOGY/ONCOLOGY | Facility: CLINIC | Age: 73
End: 2023-06-02
Payer: MEDICARE

## 2023-06-02 NOTE — NURSING
LVM regarding Survivorship. Explained to pt that a Survivorship Clinic visit is when you meet with a nurse practitioner after completing treatment for cancer. As a cancer survivor, you may face physical, psychosocial, and practical impacts from cancer and its treatment. A Survivorship Clinic visit will give you information and tools to help you after cancer treatment has ended. Instructed patient to return my call at [phone] to get scheduled.

## 2023-06-12 ENCOUNTER — PATIENT MESSAGE (OUTPATIENT)
Dept: HEMATOLOGY/ONCOLOGY | Facility: CLINIC | Age: 73
End: 2023-06-12
Payer: MEDICARE

## 2023-06-27 ENCOUNTER — TELEPHONE (OUTPATIENT)
Dept: HEMATOLOGY/ONCOLOGY | Facility: CLINIC | Age: 73
End: 2023-06-27
Payer: MEDICARE

## 2023-06-27 NOTE — TELEPHONE ENCOUNTER
Attempted to reach patient multiple times, unsuccessfully. LVM regarding Survivorship. Instructed patient to call when she is ready to schedule. Will sign off for now.

## 2024-10-16 PROBLEM — R80.9 MICROALBUMINURIA DUE TO TYPE 2 DIABETES MELLITUS: Status: ACTIVE | Noted: 2024-10-16

## 2024-10-16 PROBLEM — E11.29 MICROALBUMINURIA DUE TO TYPE 2 DIABETES MELLITUS: Status: ACTIVE | Noted: 2024-10-16

## 2024-10-16 PROBLEM — I10 ESSENTIAL HYPERTENSION: Status: ACTIVE | Noted: 2024-10-16

## 2024-11-14 PROBLEM — F02.B0 MODERATE ALZHEIMER'S DEMENTIA WITHOUT BEHAVIORAL DISTURBANCE, PSYCHOTIC DISTURBANCE, MOOD DISTURBANCE, OR ANXIETY: Status: ACTIVE | Noted: 2024-11-14

## 2024-11-14 PROBLEM — G30.9 MODERATE ALZHEIMER'S DEMENTIA WITHOUT BEHAVIORAL DISTURBANCE, PSYCHOTIC DISTURBANCE, MOOD DISTURBANCE, OR ANXIETY: Status: ACTIVE | Noted: 2024-11-14

## 2025-04-17 PROBLEM — R55 SYNCOPE: Status: ACTIVE | Noted: 2025-04-17

## 2025-04-17 PROBLEM — Z71.89 ACP (ADVANCE CARE PLANNING): Status: ACTIVE | Noted: 2025-04-17

## 2025-04-17 PROBLEM — E87.5 HYPERKALEMIA: Status: ACTIVE | Noted: 2025-04-17

## 2025-06-05 ENCOUNTER — OFFICE VISIT (OUTPATIENT)
Dept: URGENT CARE | Facility: CLINIC | Age: 75
End: 2025-06-05
Payer: MEDICARE

## 2025-06-05 VITALS
WEIGHT: 129 LBS | RESPIRATION RATE: 16 BRPM | OXYGEN SATURATION: 98 % | DIASTOLIC BLOOD PRESSURE: 66 MMHG | SYSTOLIC BLOOD PRESSURE: 139 MMHG | TEMPERATURE: 99 F | HEART RATE: 62 BPM | BODY MASS INDEX: 22.86 KG/M2 | HEIGHT: 63 IN
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
## 2025-06-05 DIAGNOSIS — R22.0 LEFT FACIAL SWELLING: ICD-10-CM

## 2025-06-05 DIAGNOSIS — H57.89 IRRITATION OF BOTH EYES: ICD-10-CM

## 2025-06-05 DIAGNOSIS — R41.3 MEMORY LOSS: ICD-10-CM

## 2025-06-05 DIAGNOSIS — S00.83XA FACIAL CONTUSION, INITIAL ENCOUNTER: Primary | ICD-10-CM

## 2025-06-05 DIAGNOSIS — S80.211A ABRASION OF RIGHT KNEE, INITIAL ENCOUNTER: ICD-10-CM

## 2025-06-05 DIAGNOSIS — S00.81XA FACIAL ABRASION, INITIAL ENCOUNTER: ICD-10-CM

## 2025-06-05 PROCEDURE — 70150 X-RAY EXAM OF FACIAL BONES: CPT | Mod: S$GLB,,, | Performed by: RADIOLOGY

## 2025-06-05 PROCEDURE — 99204 OFFICE O/P NEW MOD 45 MIN: CPT | Mod: S$GLB,,, | Performed by: EMERGENCY MEDICINE

## 2025-06-05 RX ORDER — ERYTHROMYCIN 5 MG/G
OINTMENT OPHTHALMIC
Qty: 3.5 G | Refills: 0 | Status: SHIPPED | OUTPATIENT
Start: 2025-06-05